# Patient Record
Sex: FEMALE | Race: WHITE | NOT HISPANIC OR LATINO | Employment: UNEMPLOYED | URBAN - METROPOLITAN AREA
[De-identification: names, ages, dates, MRNs, and addresses within clinical notes are randomized per-mention and may not be internally consistent; named-entity substitution may affect disease eponyms.]

---
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
## 2021-05-26 LAB
BACTERIA SPEC AEROBE CULT: NO GROWTH
C TRACH RRNA SPEC DONR QL NAA+PROBE: NORMAL
EXTERNAL ABO GROUPING: NORMAL
EXTERNAL ANTIBODY SCREEN: NORMAL
EXTERNAL HEMATOCRIT: 40 %
EXTERNAL HEMOGLOBIN: 12.9 G/DL
EXTERNAL HEPATITIS B SURFACE ANTIGEN: NEGATIVE
EXTERNAL PLATELET COUNT: 264 K/ΜL
EXTERNAL RH FACTOR: POSITIVE
EXTERNAL SYPHILIS RPR SCREEN: NORMAL
HCV AB S/CO SERPL IA: NORMAL
HEMOGLOBIN FRACTIONATION: NORMAL
HIV 1+2 AB+HIV1 P24 AG SERPL QL IA: NORMAL
N GONORRHOEA DNA SPEC QL NAA+PROBE: NORMAL
RUBV IGG SERPL IA-ACNC: NORMAL

## 2021-08-10 LAB — GLUCOSE 1H P 100 G GLC PO SERPL-MCNC: 83 MG/DL (ref 74–180)

## 2021-09-23 ENCOUNTER — ROUTINE PRENATAL (OUTPATIENT)
Dept: OBSTETRICS AND GYNECOLOGY | Facility: CLINIC | Age: 21
End: 2021-09-23

## 2021-09-23 VITALS
SYSTOLIC BLOOD PRESSURE: 116 MMHG | HEIGHT: 68 IN | DIASTOLIC BLOOD PRESSURE: 74 MMHG | WEIGHT: 162 LBS | BODY MASS INDEX: 24.55 KG/M2

## 2021-09-23 DIAGNOSIS — O30.043 DICHORIONIC DIAMNIOTIC TWIN PREGNANCY IN THIRD TRIMESTER: ICD-10-CM

## 2021-09-23 DIAGNOSIS — O99.019 MATERNAL ANEMIA IN PREGNANCY, ANTEPARTUM: ICD-10-CM

## 2021-09-23 DIAGNOSIS — Z34.80 SUPERVISION OF OTHER NORMAL PREGNANCY, ANTEPARTUM: Primary | ICD-10-CM

## 2021-09-23 LAB
DEPRECATED RDW RBC AUTO: 37.7 FL (ref 37–54)
ERYTHROCYTE [DISTWIDTH] IN BLOOD BY AUTOMATED COUNT: 11.8 % (ref 12.3–15.4)
GLUCOSE 1H P GLC SERPL-MCNC: 83 MG/DL (ref 65–139)
GLUCOSE UR STRIP-MCNC: NEGATIVE MG/DL
HCT VFR BLD AUTO: 28.3 % (ref 34–46.6)
HGB BLD-MCNC: 9.3 G/DL (ref 12–15.9)
MCH RBC QN AUTO: 29.2 PG (ref 26.6–33)
MCHC RBC AUTO-ENTMCNC: 32.9 G/DL (ref 31.5–35.7)
MCV RBC AUTO: 88.7 FL (ref 79–97)
PLATELET # BLD AUTO: 268 10*3/MM3 (ref 140–450)
PMV BLD AUTO: 11.6 FL (ref 6–12)
PROT UR STRIP-MCNC: NEGATIVE MG/DL
RBC # BLD AUTO: 3.19 10*6/MM3 (ref 3.77–5.28)
WBC # BLD AUTO: 12.62 10*3/MM3 (ref 3.4–10.8)

## 2021-09-23 PROCEDURE — 85027 COMPLETE CBC AUTOMATED: CPT | Performed by: OBSTETRICS & GYNECOLOGY

## 2021-09-23 PROCEDURE — 0502F SUBSEQUENT PRENATAL CARE: CPT | Performed by: OBSTETRICS & GYNECOLOGY

## 2021-09-23 PROCEDURE — 82950 GLUCOSE TEST: CPT | Performed by: OBSTETRICS & GYNECOLOGY

## 2021-09-23 RX ORDER — VITAMIN A ACETATE, BETA CAROTENE, ASCORBIC ACID, CHOLECALCIFEROL, .ALPHA.-TOCOPHEROL ACETATE, DL-, THIAMINE MONONITRATE, RIBOFLAVIN, NIACINAMIDE, PYRIDOXINE HYDROCHLORIDE, FOLIC ACID, CYANOCOBALAMIN, CALCIUM CARBONATE, FERROUS FUMARATE, ZINC OXIDE, CUPRIC OXIDE 3080; 12; 120; 400; 1; 1.84; 3; 20; 22; 920; 25; 200; 27; 10; 2 [IU]/1; UG/1; MG/1; [IU]/1; MG/1; MG/1; MG/1; MG/1; MG/1; [IU]/1; MG/1; MG/1; MG/1; MG/1; MG/1
TABLET, FILM COATED ORAL DAILY
COMMUNITY

## 2021-09-23 RX ORDER — DOXYLAMINE SUCCINATE AND PYRIDOXINE HYDROCHLORIDE, DELAYED RELEASE TABLETS 10 MG/10 MG 10; 10 MG/1; MG/1
1 TABLET, DELAYED RELEASE ORAL DAILY
Status: ON HOLD | COMMUNITY
Start: 2021-04-26 | End: 2021-11-26

## 2021-09-23 RX ORDER — PYRIDOXINE HCL (VITAMIN B6) 25 MG
TABLET ORAL
Status: ON HOLD | COMMUNITY
Start: 2021-05-26 | End: 2021-11-26

## 2021-09-23 NOTE — PROGRESS NOTES
Chief Complaint: Scheduled OB visit    HPI: 21 y.o.  at 27w6d   Twin pregnancy at 27.6 weeks   Patient reports baby movement of each baby  No contractions.  No headache no visual changes    Vitals:    21 1316   BP: 116/74   Weight: 73.5 kg (162 lb)       See OB flowsheet also for pregnancy related data.  Fetal heart rate approximately 155  Fundal height appropriate at 34 cm, 6 cm above dates      A/P  1. Intrauterine pregnancy at 27w6d   2. Pregnancy Risk:  HIGH RISK  High risk with twins, sandro-      Diagnoses and all orders for this visit:    1. Supervision of other normal pregnancy, antepartum (Primary)  -     POC Urinalysis Dipstick  -     Gestational Diabetes Screen  -     CBC (No Diff)  -     Tdap (BOOSTRIX) 5-2.5-18.5 LF-MCG/0.5 injection; Inject 0.5 mL into the appropriate muscle as directed by prescriber 1 (One) Time for 1 dose.  Dispense: 0.5 mL; Refill: 0    2. Dichorionic diamniotic twin pregnancy in third trimester    We discussed the need for serial ultrasounds to confirm concordant growth  Next ultrasound is scheduled with maternal-fetal medicine in October  Schedule office visit in 3 5 and 7 weeks  Continue baby aspirin to decrease risk of preeclampsia  Discussed Glucola result at next visit    Encouraged fetal kick counts, 10 movements in 2 hours every day.  To labor and delivery if lack fetal movement  Routine labor warnings were discussed and indications for L & D f/u including bleeding, regular contractions, decreased fetal movement or/and rupture of membranes.   Pre-eclampsia symptoms were discussed and warnings were given.  -----------------------  PLAN:   Return in about 3 weeks (around 10/14/2021).    Kwame Abernathy Sr., MD  2021 13:38 EDT

## 2021-09-24 ENCOUNTER — TELEPHONE (OUTPATIENT)
Dept: OBSTETRICS AND GYNECOLOGY | Facility: CLINIC | Age: 21
End: 2021-09-24

## 2021-09-24 RX ORDER — FERROUS SULFATE 325(65) MG
325 TABLET ORAL DAILY
Qty: 90 TABLET | Refills: 2 | Status: SHIPPED | OUTPATIENT
Start: 2021-09-24

## 2021-09-24 NOTE — TELEPHONE ENCOUNTER
----- Message from Kwame Abernathy Sr., MD sent at 9/24/2021  7:37 AM EDT -----  Please call the patient regarding her abnormal result.  Faiza ordered at pharmacy.

## 2021-10-14 ENCOUNTER — ROUTINE PRENATAL (OUTPATIENT)
Dept: OBSTETRICS AND GYNECOLOGY | Facility: CLINIC | Age: 21
End: 2021-10-14

## 2021-10-14 VITALS — BODY MASS INDEX: 25.61 KG/M2 | SYSTOLIC BLOOD PRESSURE: 121 MMHG | DIASTOLIC BLOOD PRESSURE: 74 MMHG | WEIGHT: 169 LBS

## 2021-10-14 DIAGNOSIS — O30.043 DICHORIONIC DIAMNIOTIC TWIN PREGNANCY IN THIRD TRIMESTER: ICD-10-CM

## 2021-10-14 DIAGNOSIS — Z34.80 SUPERVISION OF OTHER NORMAL PREGNANCY, ANTEPARTUM: Primary | ICD-10-CM

## 2021-10-14 LAB
GLUCOSE UR STRIP-MCNC: NEGATIVE MG/DL
PROT UR STRIP-MCNC: NEGATIVE MG/DL

## 2021-10-14 PROCEDURE — 0502F SUBSEQUENT PRENATAL CARE: CPT | Performed by: OBSTETRICS & GYNECOLOGY

## 2021-10-14 NOTE — PROGRESS NOTES
Chief Complaint:  Scheduled OB visit    HPI: 21 y.o.  at 30w6d     Di Di twins  Positive baby movement  No complaints today    Vitals:    10/14/21 1517   BP: 121/74   Weight: 76.7 kg (169 lb)       See OB flowsheet also for pregnancy related data.  5% discordance on last MFM ultrasound    A/P  1. Intrauterine pregnancy at 30w6d   2. Pregnancy Risk:  HIGH RISK  High risk with twin pregnancy      Diagnoses and all orders for this visit:    1. Supervision of other normal pregnancy, antepartum (Primary)  -     POC Urinalysis Dipstick    2. Dichorionic diamniotic twin pregnancy in third trimester        Encouraged fetal kick counts, 10 movements in 2 hours every day.  To labor and delivery if lack fetal movement  Routine labor warnings were discussed and indications for L & D f/u including bleeding, regular contractions, decreased fetal movement or/and rupture of membranes.      Discussed delivery in the 38th week.  Induction of Vertex vertex   if 1 baby nonvertex  Patient has maternal-fetal medicine growth ultrasound scheduled.  -----------------------  PLAN:   Return in about 2 weeks (around 10/28/2021).    Kwame Abernathy Sr., MD  10/14/2021 15:41 EDT

## 2021-10-28 ENCOUNTER — ROUTINE PRENATAL (OUTPATIENT)
Dept: OBSTETRICS AND GYNECOLOGY | Facility: CLINIC | Age: 21
End: 2021-10-28

## 2021-10-28 VITALS — SYSTOLIC BLOOD PRESSURE: 115 MMHG | WEIGHT: 176 LBS | BODY MASS INDEX: 26.67 KG/M2 | DIASTOLIC BLOOD PRESSURE: 74 MMHG

## 2021-10-28 DIAGNOSIS — O30.043 DICHORIONIC DIAMNIOTIC TWIN PREGNANCY IN THIRD TRIMESTER: ICD-10-CM

## 2021-10-28 DIAGNOSIS — Z34.80 SUPERVISION OF OTHER NORMAL PREGNANCY, ANTEPARTUM: Primary | ICD-10-CM

## 2021-10-28 LAB
GLUCOSE UR STRIP-MCNC: NEGATIVE MG/DL
PROT UR STRIP-MCNC: NEGATIVE MG/DL

## 2021-10-28 PROCEDURE — 0502F SUBSEQUENT PRENATAL CARE: CPT | Performed by: OBSTETRICS & GYNECOLOGY

## 2021-10-28 NOTE — PROGRESS NOTES
Chief Complaint:  Scheduled OB visit with twins    HPI: 21 y.o.  at 32w6d   Good baby movement of each  No bleeding or loss of fluid    Vitals:    10/28/21 1512   BP: 115/74   Weight: 79.8 kg (176 lb)       See OB flowsheet also for pregnancy related data.    A/P  1. Intrauterine pregnancy at 32w6d   2. Pregnancy Risk:  HIGH RISK    With twin pregnancy    Diagnoses and all orders for this visit:    1. Supervision of other normal pregnancy, antepartum (Primary)  -     POC Urinalysis Dipstick    2. Dichorionic diamniotic twin pregnancy in third trimester    MFM visit next week to check concordant growth    Continue prenatal vitamins.  Encouraged fetal kick counts, 10 movements in 2 hours every day.  To labor and delivery if lack fetal movement  Routine labor warnings were discussed and indications for L & D f/u including bleeding, regular contractions, decreased fetal movement or/and rupture of membranes.   -----------------------  PLAN:   Follow-up in 2 weeks, begin NSTs in 2 weeks.    Kwame Abernathy Sr., MD  10/28/2021 15:48 EDT

## 2021-11-11 ENCOUNTER — ROUTINE PRENATAL (OUTPATIENT)
Dept: OBSTETRICS AND GYNECOLOGY | Facility: CLINIC | Age: 21
End: 2021-11-11

## 2021-11-11 ENCOUNTER — HOSPITAL ENCOUNTER (OUTPATIENT)
Facility: HOSPITAL | Age: 21
Discharge: HOME | End: 2021-11-11
Attending: OBSTETRICS & GYNECOLOGY | Admitting: OBSTETRICS & GYNECOLOGY

## 2021-11-11 ENCOUNTER — TELEPHONE (OUTPATIENT)
Dept: OBSTETRICS AND GYNECOLOGY | Facility: CLINIC | Age: 21
End: 2021-11-11

## 2021-11-11 VITALS — DIASTOLIC BLOOD PRESSURE: 82 MMHG | SYSTOLIC BLOOD PRESSURE: 137 MMHG | WEIGHT: 177 LBS | BODY MASS INDEX: 26.83 KG/M2

## 2021-11-11 VITALS — SYSTOLIC BLOOD PRESSURE: 133 MMHG | HEART RATE: 82 BPM | DIASTOLIC BLOOD PRESSURE: 78 MMHG | RESPIRATION RATE: 18 BRPM

## 2021-11-11 DIAGNOSIS — Z34.80 SUPERVISION OF OTHER NORMAL PREGNANCY, ANTEPARTUM: Primary | ICD-10-CM

## 2021-11-11 DIAGNOSIS — O30.043 DICHORIONIC DIAMNIOTIC TWIN PREGNANCY IN THIRD TRIMESTER: ICD-10-CM

## 2021-11-11 LAB
GLUCOSE UR STRIP-MCNC: NEGATIVE MG/DL
PROT UR STRIP-MCNC: NEGATIVE MG/DL

## 2021-11-11 PROCEDURE — 59025 FETAL NON-STRESS TEST: CPT

## 2021-11-11 PROCEDURE — 59025 FETAL NON-STRESS TEST: CPT | Performed by: OBSTETRICS & GYNECOLOGY

## 2021-11-11 PROCEDURE — 59425 ANTEPARTUM CARE ONLY: CPT | Performed by: OBSTETRICS & GYNECOLOGY

## 2021-11-11 PROCEDURE — G0463 HOSPITAL OUTPT CLINIC VISIT: HCPCS

## 2021-11-11 RX ORDER — ASPIRIN 81 MG/1
81 TABLET, CHEWABLE ORAL DAILY
Status: ON HOLD | COMMUNITY
End: 2021-11-26

## 2021-11-11 NOTE — NON STRESS TEST
JOANA Unger   OB NST Note    2021   Name:  Rajani Gomez  MRN: [id number]    Subjective:  21 y.o.  at 34w6d    Indication: Twins    NST:   Baseline: Twin A 135, twin B 130  Variability:   Moderate/Normal (amplitude 6-25 bpm)  Accelerations: Present (32 weeks+) 15 x 15 bpm  Decelerations: Absent   Contractions:  Irritability    NST interpretation: reactive    There were no vitals filed for this visit.      Lab Results (last 24 hours)     Procedure Component Value Units Date/Time    POC Urinalysis Dipstick [[id number]] Collected: 21 1506    Specimen: Urine Updated: 21 1507     Glucose, UA Negative mg/dL      Protein, POC Negative mg/dL            Cervical Exam:    Assessment:  21 y.o.  AT 34w6d  Twins    Plan:   OB Precautions, FKC, Keep scheduled NSTs, Keep office visit      Electronically signed by Js Umana MD, 21, 5:19 PM EST.

## 2021-11-11 NOTE — PROGRESS NOTES
Chief Complaint:  Scheduled OB visit    HPI: 21 y.o.  at 34w6d   Twins  Good movement of both babies  Most recent ultrasound with MFM notes 8% discordance  CHIRAG breech which will result in  delivery if unchanged.    Vitals:    21 1506   BP: 137/82   Weight: 80.3 kg (177 lb)       See OB flowsheet also for pregnancy related data.    A/P  Intrauterine pregnancy at 34w6d   Diagnoses and all orders for this visit:    1. Supervision of other normal pregnancy, antepartum (Primary)  -     POC Urinalysis Dipstick  -     Fetal Nonstress Test; Standing    2. Dichorionic diamniotic twin pregnancy in third trimester    Probable  due to breech twin A  Start NSTs    Blood pressure is out of parameters.  137/82    Continue prenatal vitamins.  Encouraged fetal kick counts, 10 movements in 2 hours every day.  To labor and delivery if lack fetal movement  Routine labor warnings were discussed and indications for L & D f/u including bleeding, regular contractions, decreased fetal movement or/and rupture of membranes.     PLAN:   Return in about 1 week (around 2021).    Kwame Abernathy Sr., MD  2021 15:45 EST

## 2021-11-12 ENCOUNTER — TELEPHONE (OUTPATIENT)
Dept: OBSTETRICS AND GYNECOLOGY | Facility: CLINIC | Age: 21
End: 2021-11-12

## 2021-11-12 ENCOUNTER — PREP FOR SURGERY (OUTPATIENT)
Dept: OTHER | Facility: HOSPITAL | Age: 21
End: 2021-11-12

## 2021-11-12 NOTE — TELEPHONE ENCOUNTER
Left msg 11/11 and 11/12/21 regarding upcoming PCS on 12/6/21.  Patient needs to speak w/Jamila/mailed info out to patient/mjm

## 2021-11-12 NOTE — SIGNIFICANT NOTE
34w6d  /78 (BP Location: Right arm, Patient Position: Sitting)   Pulse 82   Resp 18   LMP 03/20/2021   Reason for test: Other (Comment) (Multiple Gestation)  Date of Test: 11/11/2021  Time frame of test: 5585-7402  RN NST Interpretation: (P) Reactive     11/11/21 1921   Nonstress Test   Reason for NST Other (Comment)  (Multiple Gestation)   Acoustic Stimulator No   Uterine Irritability No   Contractions Not present   Fetal Assessment   Fetal Movement active   Fetal HR Assessment Method external   Fetal HR (beats/min) 140   Fetal Heart Baseline Rate normal range   Fetal HR Variability moderate (amplitude range 6 to 25 bpm)   Fetal HR Accelerations greater than/equal to 15 bpm; lasting at least 15 seconds   Fetal HR Decelerations absent   Fetal HR Tracing Category Category I   Sinusoidal Pattern Present absent   Additional Documentation Fetal Assessment B (Group)   Interpretation A   Nonstress Test Interpretation A Reactive   Fetal Assessment B   Fetal Movement B active   Fetal HR Assessment Method B external   Fetal HR B (beats/min) 130   Fetal Heart Baseline Rate B normal range   Fetal HR Variability B moderate (amplitude range 6 to 25 bpm)   Fetal HR Accelerations B greater than/equal to 15 bpm; lasting at least 15 seconds   Fetal HR Decelerations B absent   Fetal HR Tracing Category B Category I   Sinusoidal Pattern Present B absent   Interpretation B   Nonstress Test Interpretation B Reactive

## 2021-11-18 ENCOUNTER — HOSPITAL ENCOUNTER (OUTPATIENT)
Facility: HOSPITAL | Age: 21
Setting detail: SURGERY ADMIT
Discharge: HOME | End: 2021-11-18
Attending: OBSTETRICS & GYNECOLOGY | Admitting: OBSTETRICS & GYNECOLOGY

## 2021-11-18 ENCOUNTER — HOSPITAL ENCOUNTER (OUTPATIENT)
Dept: LABOR AND DELIVERY | Facility: HOSPITAL | Age: 21
Setting detail: SURGERY ADMIT
Discharge: HOME | End: 2021-11-18

## 2021-11-18 VITALS — HEART RATE: 91 BPM | RESPIRATION RATE: 18 BRPM | SYSTOLIC BLOOD PRESSURE: 132 MMHG | DIASTOLIC BLOOD PRESSURE: 72 MMHG

## 2021-11-18 PROCEDURE — 59025 FETAL NON-STRESS TEST: CPT

## 2021-11-18 PROCEDURE — G0463 HOSPITAL OUTPT CLINIC VISIT: HCPCS

## 2021-11-18 PROCEDURE — 59025 FETAL NON-STRESS TEST: CPT | Performed by: OBSTETRICS & GYNECOLOGY

## 2021-11-18 NOTE — SIGNIFICANT NOTE
11/18/21 1813   Nonstress Test   Reason for NST OB Triage   Acoustic Stimulator No   Uterine Irritability No   Contractions Not present   Fetal Assessment   Fetal Movement active   Fetal HR Assessment Method external   Fetal HR (beats/min) 130   Fetal Heart Baseline Rate normal range   Fetal HR Variability moderate (amplitude range 6 to 25 bpm)   Fetal HR Accelerations lasting at least 15 seconds   Fetal HR Decelerations absent   Interpretation A   Nonstress Test Interpretation A Reactive   Fetal Assessment B   Fetal Movement B active   Fetal HR Assessment Method B external   Fetal HR B (beats/min) 120   Fetal Heart Baseline Rate B normal range   Fetal HR Accelerations B lasting at least 15 seconds   Fetal HR Decelerations B absent   Interpretation B   Nonstress Test Interpretation B Reactive   /72 (BP Location: Left arm, Patient Position: Lying)   Pulse 91   Resp 18   LMP 03/20/2021   35w6d  Reason for test: OB Triage  Date of Test: 11/18/2021  Time frame of test: 9591-9040  RN NST Interpretation: Reactive

## 2021-11-19 ENCOUNTER — PREP FOR SURGERY (OUTPATIENT)
Dept: OTHER | Facility: HOSPITAL | Age: 21
End: 2021-11-19

## 2021-11-19 ENCOUNTER — HOSPITAL ENCOUNTER (OUTPATIENT)
Facility: HOSPITAL | Age: 21
Setting detail: SURGERY ADMIT
Discharge: HOME | End: 2021-11-19
Attending: OBSTETRICS & GYNECOLOGY | Admitting: OBSTETRICS & GYNECOLOGY

## 2021-11-19 ENCOUNTER — HOSPITAL ENCOUNTER (OUTPATIENT)
Facility: HOSPITAL | Age: 21
Discharge: HOME | End: 2021-11-19
Attending: OBSTETRICS & GYNECOLOGY | Admitting: OBSTETRICS & GYNECOLOGY

## 2021-11-19 VITALS
HEART RATE: 100 BPM | TEMPERATURE: 98 F | DIASTOLIC BLOOD PRESSURE: 81 MMHG | BODY MASS INDEX: 26.83 KG/M2 | SYSTOLIC BLOOD PRESSURE: 140 MMHG | RESPIRATION RATE: 18 BRPM | WEIGHT: 177 LBS | OXYGEN SATURATION: 92 %

## 2021-11-19 VITALS
TEMPERATURE: 98.5 F | SYSTOLIC BLOOD PRESSURE: 119 MMHG | HEART RATE: 89 BPM | RESPIRATION RATE: 18 BRPM | DIASTOLIC BLOOD PRESSURE: 86 MMHG

## 2021-11-19 DIAGNOSIS — O13.3 GESTATIONAL HYPERTENSION WITHOUT SIGNIFICANT PROTEINURIA IN THIRD TRIMESTER: Primary | ICD-10-CM

## 2021-11-19 LAB
ALBUMIN SERPL-MCNC: 3.2 G/DL (ref 3.5–5.2)
ALBUMIN/GLOB SERPL: 1.1 G/DL
ALP SERPL-CCNC: 165 U/L (ref 39–117)
ALT SERPL W P-5'-P-CCNC: 13 U/L (ref 1–33)
ANION GAP SERPL CALCULATED.3IONS-SCNC: 10.3 MMOL/L (ref 5–15)
AST SERPL-CCNC: 21 U/L (ref 1–32)
BILIRUB SERPL-MCNC: 0.3 MG/DL (ref 0–1.2)
BUN SERPL-MCNC: 11 MG/DL (ref 6–20)
BUN/CREAT SERPL: 17.7 (ref 7–25)
CALCIUM SPEC-SCNC: 9.4 MG/DL (ref 8.6–10.5)
CHLORIDE SERPL-SCNC: 104 MMOL/L (ref 98–107)
CO2 SERPL-SCNC: 23.7 MMOL/L (ref 22–29)
CREAT SERPL-MCNC: 0.62 MG/DL (ref 0.57–1)
CREAT UR-MCNC: 39.6 MG/DL
DEPRECATED RDW RBC AUTO: 42 FL (ref 37–54)
ERYTHROCYTE [DISTWIDTH] IN BLOOD BY AUTOMATED COUNT: 13.5 % (ref 12.3–15.4)
GFR SERPL CREATININE-BSD FRML MDRD: 123 ML/MIN/1.73
GLOBULIN UR ELPH-MCNC: 2.8 GM/DL
GLUCOSE SERPL-MCNC: 69 MG/DL (ref 65–99)
HCT VFR BLD AUTO: 31.7 % (ref 34–46.6)
HGB BLD-MCNC: 10.1 G/DL (ref 12–15.9)
MCH RBC QN AUTO: 27.7 PG (ref 26.6–33)
MCHC RBC AUTO-ENTMCNC: 31.9 G/DL (ref 31.5–35.7)
MCV RBC AUTO: 87.1 FL (ref 79–97)
PLATELET # BLD AUTO: 193 10*3/MM3 (ref 140–450)
PMV BLD AUTO: 13.2 FL (ref 6–12)
POTASSIUM SERPL-SCNC: 3.8 MMOL/L (ref 3.5–5.2)
PROT ?TM UR-MCNC: 9 MG/DL
PROT SERPL-MCNC: 6 G/DL (ref 6–8.5)
PROT/CREAT UR: 0.2 MG/G{CREAT}
RBC # BLD AUTO: 3.64 10*6/MM3 (ref 3.77–5.28)
SODIUM SERPL-SCNC: 138 MMOL/L (ref 136–145)
WBC NRBC COR # BLD: 11.95 10*3/MM3 (ref 3.4–10.8)

## 2021-11-19 PROCEDURE — 59025 FETAL NON-STRESS TEST: CPT

## 2021-11-19 PROCEDURE — G0463 HOSPITAL OUTPT CLINIC VISIT: HCPCS

## 2021-11-19 PROCEDURE — 84156 ASSAY OF PROTEIN URINE: CPT | Performed by: OBSTETRICS & GYNECOLOGY

## 2021-11-19 PROCEDURE — 80053 COMPREHEN METABOLIC PANEL: CPT | Performed by: OBSTETRICS & GYNECOLOGY

## 2021-11-19 PROCEDURE — 99214 OFFICE O/P EST MOD 30 MIN: CPT | Performed by: OBSTETRICS & GYNECOLOGY

## 2021-11-19 PROCEDURE — 85027 COMPLETE CBC AUTOMATED: CPT | Performed by: OBSTETRICS & GYNECOLOGY

## 2021-11-19 PROCEDURE — 25010000002 BETAMETHASONE ACET & SOD PHOS PER 4 MG: Performed by: OBSTETRICS & GYNECOLOGY

## 2021-11-19 PROCEDURE — 82570 ASSAY OF URINE CREATININE: CPT | Performed by: OBSTETRICS & GYNECOLOGY

## 2021-11-19 PROCEDURE — 59025 FETAL NON-STRESS TEST: CPT | Performed by: OBSTETRICS & GYNECOLOGY

## 2021-11-19 PROCEDURE — 96372 THER/PROPH/DIAG INJ SC/IM: CPT

## 2021-11-19 RX ORDER — BETAMETHASONE SODIUM PHOSPHATE AND BETAMETHASONE ACETATE 3; 3 MG/ML; MG/ML
12 INJECTION, SUSPENSION INTRA-ARTICULAR; INTRALESIONAL; INTRAMUSCULAR; SOFT TISSUE EVERY 24 HOURS
Status: DISCONTINUED | OUTPATIENT
Start: 2021-11-19 | End: 2021-11-19 | Stop reason: HOSPADM

## 2021-11-19 RX ORDER — MISOPROSTOL 200 UG/1
800 TABLET ORAL ONCE AS NEEDED
Status: CANCELLED | OUTPATIENT
Start: 2021-11-19

## 2021-11-19 RX ORDER — SODIUM CHLORIDE 0.9 % (FLUSH) 0.9 %
10 SYRINGE (ML) INJECTION AS NEEDED
Status: CANCELLED | OUTPATIENT
Start: 2021-11-19

## 2021-11-19 RX ORDER — SODIUM CHLORIDE 0.9 % (FLUSH) 0.9 %
10 SYRINGE (ML) INJECTION EVERY 12 HOURS SCHEDULED
Status: CANCELLED | OUTPATIENT
Start: 2021-11-19

## 2021-11-19 RX ORDER — CARBOPROST TROMETHAMINE 250 UG/ML
250 INJECTION, SOLUTION INTRAMUSCULAR
Status: CANCELLED | OUTPATIENT
Start: 2021-11-19

## 2021-11-19 RX ORDER — OXYTOCIN-SODIUM CHLORIDE 0.9% IV SOLN 30 UNIT/500ML 30-0.9/5 UT/ML-%
125 SOLUTION INTRAVENOUS ONCE
Status: CANCELLED | OUTPATIENT
Start: 2021-11-19 | End: 2021-11-19

## 2021-11-19 RX ORDER — LIDOCAINE HYDROCHLORIDE 10 MG/ML
5 INJECTION, SOLUTION EPIDURAL; INFILTRATION; INTRACAUDAL; PERINEURAL AS NEEDED
Status: CANCELLED | OUTPATIENT
Start: 2021-11-19

## 2021-11-19 RX ORDER — ACETAMINOPHEN 500 MG
500 TABLET ORAL EVERY 6 HOURS PRN
COMMUNITY

## 2021-11-19 RX ORDER — TRISODIUM CITRATE DIHYDRATE AND CITRIC ACID MONOHYDRATE 500; 334 MG/5ML; MG/5ML
30 SOLUTION ORAL ONCE
Status: CANCELLED | OUTPATIENT
Start: 2021-11-19 | End: 2021-11-19

## 2021-11-19 RX ORDER — CEFAZOLIN SODIUM 2 G/100ML
2 INJECTION, SOLUTION INTRAVENOUS ONCE
Status: CANCELLED | OUTPATIENT
Start: 2021-11-19 | End: 2021-11-19

## 2021-11-19 RX ORDER — BETAMETHASONE SODIUM PHOSPHATE AND BETAMETHASONE ACETATE 3; 3 MG/ML; MG/ML
12 INJECTION, SUSPENSION INTRA-ARTICULAR; INTRALESIONAL; INTRAMUSCULAR; SOFT TISSUE EVERY 24 HOURS
Status: CANCELLED | OUTPATIENT
Start: 2021-11-20 | End: 2021-11-21

## 2021-11-19 RX ORDER — SODIUM CHLORIDE, SODIUM LACTATE, POTASSIUM CHLORIDE, CALCIUM CHLORIDE 600; 310; 30; 20 MG/100ML; MG/100ML; MG/100ML; MG/100ML
125 INJECTION, SOLUTION INTRAVENOUS CONTINUOUS
Status: CANCELLED | OUTPATIENT
Start: 2021-11-19

## 2021-11-19 RX ORDER — METHYLERGONOVINE MALEATE 0.2 MG/ML
200 INJECTION INTRAVENOUS ONCE AS NEEDED
Status: CANCELLED | OUTPATIENT
Start: 2021-11-19

## 2021-11-19 RX ADMIN — BETAMETHASONE ACETATE AND BETAMETHASONE SODIUM PHOSPHATE 12 MG: 3; 3 INJECTION, SUSPENSION INTRA-ARTICULAR; INTRALESIONAL; INTRAMUSCULAR; SOFT TISSUE at 13:10

## 2021-11-19 NOTE — SIGNIFICANT NOTE
36w0d  Patient Vitals for the past 24 hrs:   BP Temp Temp src Pulse Resp   11/19/21 1250 119/86 -- -- 89 --   11/19/21 1231 124/82 -- -- 84 --   11/19/21 1224 127/92 98.5 °F (36.9 °C) Oral 97 18      Date of Test: 11/19/2021  Time frame of test: 3874-0665  RN NST Interpretation: (P) Reactive       11/19/21 1324   Nonstress Test   Reason for NST   (ELEVATED BLOOD PRESSURE, SENT FROM [facility])   Acoustic Stimulator No   Uterine Irritability No   Contractions Irregular   Fetal Assessment   Fetal Movement active   Fetal HR Assessment Method external   Fetal HR (beats/min) 135   Fetal Heart Baseline Rate normal range   Fetal HR Variability moderate (amplitude range 6 to 25 bpm)   Fetal HR Accelerations lasting at least 15 seconds; greater than/equal to 15 bpm   Fetal HR Decelerations absent   Sinusoidal Pattern Present absent   Interpretation A   Nonstress Test Interpretation A Reactive   Fetal Assessment B   Fetal Movement B active   Fetal HR Assessment Method B external   Fetal HR B (beats/min) 130   Fetal Heart Baseline Rate B normal range   Fetal HR Variability B moderate (amplitude range 6 to 25 bpm)   Fetal HR Accelerations B greater than/equal to 15 bpm; lasting at least 15 seconds   Fetal HR Decelerations B absent   Sinusoidal Pattern Present B absent   Interpretation B   Nonstress Test Interpretation B Reactive

## 2021-11-19 NOTE — H&P
Obstetric History and Physical    Chief complaint: Twins, gestational hypertension    Subjective     Patient is a 21 y.o. female  currently at 36w0d, scheduled for  delivery at 37.0 weeks secondary to gestational hypertension.  The route of delivery is primary  due to nonvertex presentation of twin A.  Patient's pregnancy is complicated by twins and current gestational hypertension.  She has been followed by maternal-fetal medicine for ultrasounds and biophysical profiles.  Most recent ultrasound shows 8% discordance with twin A being breech.  The most recent BPP shows twin A at 8 of 10 twin B 8 of 10.  Patient's been evaluated at [facility] on the same day and has mild gestational hypertension.  Delivery has been moved to 37 weeks.  Preeclampsia precautions given.  Betamethasone administered at 36.0 and 36.1 weeks.    Prenatal Information:  Prenatal Results     POC Urine Glucose/Protein     Test Value Reference Range Date Time    Urine Glucose  Negative mg/dL Negative, 1000 mg/dL (3+) 21 1506    Urine Protein  Negative mg/dL Negative 21 1506          Initial Prenatal Labs     Test Value Reference Range Date Time    Hemoglobin  10.1 g/dL 12.0 - 15.9 21 1202       9.3 g/dL 12.0 - 15.9 21 1437      ^ 12.9 g/dL  21     Hematocrit  31.7 % 34.0 - 46.6 21 1202       28.3 % 34.0 - 46.6 21 1437      ^ 40 %  21     Platelets  193 10*3/mm3 140 - 450 21 1202       268 10*3/mm3 140 - 450 21 1437      ^ 264 K/µL  21     Rubella IgG ^ immune   21     Hepatitis B SAg ^ Negative   21     Hepatitis C Ab ^ neg   21     RPR ^ Non-Reactive   21     ABO ^ O   21     Rh ^ Positive   21     Antibody Screen ^ Normal  Normal 21     HIV ^ neg   21     Urine Culture ^ No growth  No growth at 24 hours, No growth at 2 days, No growth at 3 days, No growth, Growth present, too young to evaluate, Culture in  progress 05/26/21     Gonorrhea ^ neg   05/26/21     Chlamydia ^ neg   05/26/21     TSH        HgB A1c               2nd and 3rd Trimester     Test Value Reference Range Date Time    Hemoglobin (repeated)  10.1 g/dL 12.0 - 15.9 11/19/21 1202       9.3 g/dL 12.0 - 15.9 09/23/21 1437    Hematocrit (repeated)  31.7 % 34.0 - 46.6 11/19/21 1202       28.3 % 34.0 - 46.6 09/23/21 1437    Platelets   193 10*3/mm3 140 - 450 11/19/21 1202       268 10*3/mm3 140 - 450 09/23/21 1437      ^ 264 K/µL  05/26/21     GCT  83 mg/dL 65 - 139 09/23/21 1437      ^ 83 mg/dL 74 - 180 08/10/21     Antibody Screen (repeated)        GTT Fasting        GTT 1 Hr        GTT 2 Hr        GTT 3 Hr        Group B Strep              Drug Screening     Test Value Reference Range Date Time    Amphetamine Screen        Barbiturate Screen        Benzodiazepine Screen        Methadone Screen        Phencyclidine Screen        Opiates Screen        THC Screen        Cocaine Screen        Propoxyphene Screen        Buprenorphine Screen        Methamphetamine Screen        Oxycodone Screen        Tricyclic Antidepressants Screen              Other (Risk screening)     Test Value Reference Range Date Time    Varicella IgG        Parvovirus IgG        CMV IgG        Cystic Fibrosis        Hemoglobin electrophoresis ^ normal   05/26/21     NIPT        MSAFP-4        AFP (for NTD only)              Legend    ^: Historical                      External Prenatal Results     Pregnancy Outside Results - Transcribed From Office Records - See Scanned Records For Details     Test Value Date Time    ABO ^ O  05/26/21     Rh ^ Positive  05/26/21     Antibody Screen ^ Normal  05/26/21     Varicella IgG       Rubella ^ immune  05/26/21     Hgb  10.1 g/dL 11/19/21 1202       9.3 g/dL 09/23/21 1437      ^ 12.9 g/dL 05/26/21     Hct  31.7 % 11/19/21 1202       28.3 % 09/23/21 1437      ^ 40 % 05/26/21     Glucose Fasting GTT       Glucose Tolerance Test 1 hour       Glucose  Tolerance Test 3 hour       Gonorrhea (discrete) ^ neg  21     Chlamydia (discrete) ^ neg  21     RPR ^ Non-Reactive  21     VDRL       Syphilis Antibody       HBsAg ^ Negative  21     Herpes Simplex Virus PCR       Herpes Simplex VIrus Culture       HIV ^ neg  21     Hep C RNA Quant PCR       Hep C Antibody ^ neg  21     AFP       Group B Strep       GBS Susceptibility to Clindamycin       GBS Susceptibility to Erythromycin       Fetal Fibronectin       Genetic Testing, Maternal Blood             Drug Screening     Test Value Date Time    Urine Drug Screen       Amphetamine Screen       Barbiturate Screen       Benzodiazepine Screen       Methadone Screen       Phencyclidine Screen       Opiates Screen       THC Screen       Cocaine Screen       Propoxyphene Screen       Buprenorphine Screen       Methamphetamine Screen       Oxycodone Screen       Tricyclic Antidepressants Screen             Legend    ^: Historical                         Past OB History:     OB History    Para Term  AB Living   1 0 0 0 0 0   SAB IAB Ectopic Molar Multiple Live Births   0 0 0 0 0 0      # Outcome Date GA Lbr Keo/2nd Weight Sex Delivery Anes PTL Lv   1 Current                Past Medical History: No past medical history on file.   Past Surgical History No past surgical history on file.   Family History: Family History   Problem Relation Age of Onset   • Heart disease Other    • Hyperlipidemia Other    • Diabetes Other    • Ovarian cancer Other    • Lung cancer Other       Social History:  reports that she has never smoked. She does not have any smokeless tobacco history on file.  Alcohol use questions deferred to the physician.        General ROS: No headache, no right upper quadrant pain.    Positive movement of twins    Objective       Vital Signs Range for the last 24 hours  Temperature: Temp:  [98.5 °F (36.9 °C)] 98.5 °F (36.9 °C)   Temp Source: Temp src: Oral   BP: BP:  (119-132)/(72-92) 119/86   Pulse: Heart Rate:  [84-97] 89   Respirations: Resp:  [18] 18   SPO2:     O2 Amount (l/min):     O2 Devices     Weight:       Physical Examination: General appearance - alert, well appearing, and in no distress  Mental status - normal mood, behavior, speech, dress, motor activity, and thought processes  Lymphatics - no palpable lymphadenopathy  Chest -unlabored breathing  Heart - S1 and S2 normal  Abdomen -soft, nontender, gravid.  Extremities - no pedal edema noted  Skin - normal coloration and turgor, no rashes, no suspicious skin lesions noted    Presentation:  Baby A breech, baby B vertex       Fetal Heart Rate Assessment       Assessment/Plan     37.0 weeks on day of scheduled delivery.  Twin gestation, nonvertex baby A  Gestational hypertension      Assessment:  Intrauterine pregnancy at 36w0d gestation with reassuring fetal status.    37.0 weeks on day of scheduled delivery.  Twin gestation, nonvertex baby A  Gestational hypertension   GBS status: No results found for: STREPGPB    Plan:  Primary  delivery.  2 g Kefzol ordered.  Plan of care has been reviewed with patient.  Risks, benefits of treatment plan have been discussed.  All questions have been answered.         Kwame Abernathy Sr., MD  2021  13:31 EST

## 2021-11-19 NOTE — PROGRESS NOTES
Obstetrical Non-stress Test Interpretation     Name:  Rajani Gomez  MRN: [id number]    21 y.o. female  at 36w0d    Indication: Twins, hypertension    Weeks Gestation: 36w0d   Patient was at maternal-fetal medicine ultrasound visit this morning. She was found to have systolic blood pressure of 148. Dr. Frank Viera called me and asked for patient to be evaluated at [facility].  Patient has mild hypertension during her visit now. She has now diagnosed with mild gestational hypertension, twin pregnancy, nonvertex presentation.  I discussed the risk of preeclampsia and worsening hypertension. We have discussed the risk of stillbirth, vaginal bleeding, abruption, preeclampsia, etc. Patient agrees to delivery at 37.0 weeks next Friday to arrive at 1 PM.  Delivery will be by primary  due to nonvertex presentation of a twin.      Baseline: 130 BPM  Variability:   Moderate/Normal (amplitude 6-25 bpm)  Accelerations: Present (32 weeks+) 15 x 15 bpm  Decelerations: Absent   Contractions:  Absent    Impression:  Reactive NST, 36.0 weeks., Twins, nonvertex  Gestational hypertension  The need for delivery was discussed in depth with the patient. The patient agrees to the plan. Route of delivery will be . Risk and benefits of surgery have been discussed.    Plan: Discharge to home. Follow-up in 7 days at the hospital for delivery.  Preeclampsia prevention instructions given.      Kwame Abernathy Sr., MD  2021  13:10 EST

## 2021-11-19 NOTE — NON STRESS TEST
JOANA Unger   OB NST Note    2021   Name:  Rajani Gomez  MRN: [id number]    Subjective:  21 y.o.  at 35w6d    Indication: Twins    NST:   Baseline: A 140 B 135  Variability:   Moderate/Normal (amplitude 6-25 bpm)  Accelerations: Present (32 weeks+) 15 x 15 bpm  Decelerations: Absent   Contractions:  Not regular    NST interpretation: reactive x2    Documented Vitals    21 1800   BP: 132/72   Pulse: 91   Resp: 18         Lab Results (last 24 hours)     ** No results found for the last 24 hours. **           Cervical Exam:    Assessment:  21 y.o.  AT 35w6d  Twins    Plan:   OB Precautions, FKC, Keep scheduled NSTs, Keep office visit      Electronically signed by Js Umana MD, 21, 8:10 PM EST.

## 2021-11-20 ENCOUNTER — HOSPITAL ENCOUNTER (OUTPATIENT)
Facility: HOSPITAL | Age: 21
Setting detail: SURGERY ADMIT
Discharge: HOME | End: 2021-11-20
Attending: OBSTETRICS & GYNECOLOGY | Admitting: OBSTETRICS & GYNECOLOGY

## 2021-11-20 DIAGNOSIS — O13.3 GESTATIONAL HYPERTENSION WITHOUT SIGNIFICANT PROTEINURIA IN THIRD TRIMESTER: ICD-10-CM

## 2021-11-20 PROCEDURE — 25010000002 BETAMETHASONE ACET & SOD PHOS PER 4 MG: Performed by: OBSTETRICS & GYNECOLOGY

## 2021-11-20 PROCEDURE — 96372 THER/PROPH/DIAG INJ SC/IM: CPT

## 2021-11-20 RX ORDER — BETAMETHASONE SODIUM PHOSPHATE AND BETAMETHASONE ACETATE 3; 3 MG/ML; MG/ML
12 INJECTION, SUSPENSION INTRA-ARTICULAR; INTRALESIONAL; INTRAMUSCULAR; SOFT TISSUE EVERY 24 HOURS
Status: COMPLETED | OUTPATIENT
Start: 2021-11-20 | End: 2021-11-20

## 2021-11-20 RX ADMIN — BETAMETHASONE ACETATE AND BETAMETHASONE SODIUM PHOSPHATE 12 MG: 3; 3 INJECTION, SUSPENSION INTRA-ARTICULAR; INTRALESIONAL; INTRAMUSCULAR; SOFT TISSUE at 13:22

## 2021-11-20 NOTE — NURSING NOTE
Dr Abernathy notified of   36w0d c/o intermittent abdominal pain, dizziness and seeing spots. Pt is carrying twin and scheduled for  at 37 wks due to nonvertex presentation of baby A. She received a betamethasone shot earlier in the day.   EFM shows contractions 5-6 min apart , both FHR reactive. BP's elevated but not in critical range.Orders received to monitor, provide po hydration and discharge if not in active labor and if B/P remains below critical range.

## 2021-11-20 NOTE — NURSING NOTE
Patient here for second betamethasone dose.  Patient denies any bleeding, leaking, headaches, blurry vision, right epigastric pain. Patient is reporting good fetal movements and is denying any new or worsening contractions at this.     Patient may be discharged to home. Discharge instructions given and reviewed with patient, with emphasis on monitoring for signs of increasing BP, signs of labor, and doing fetal kick counts. Patient agrees with plan of care and verbalizes understanding of discharge instructions. No further questions at this time and patient agrees to keep scheduled follow up appointment.

## 2021-11-20 NOTE — SIGNIFICANT NOTE
36w0d   /81   Pulse 100   Temp 98 °F (36.7 °C) (Oral)   Resp 18   Wt 80.3 kg (177 lb)   LMP 2021   SpO2 92%   BMI 26.83 kg/m²      Reason for test: (P) OB Triage, Hypertension  Date of Test: 2021  Time frame of test: 8311-2485  RN NST Interpretation:   Reactive     21   Nonstress Test   Reason for NST OB Triage; Hypertension   Acoustic Stimulator No   Uterine Irritability No   Contractions Irregular   Contraction Frequency (Minutes) 5-6   Fetal Assessment   Fetal Movement active   Fetal HR Assessment Method external   Fetal HR (beats/min) 130   Fetal Heart Baseline Rate normal range   Fetal HR Variability moderate (amplitude range 6 to 25 bpm)   Fetal HR Accelerations lasting at least 15 seconds; greater than/equal to 15 bpm   Fetal HR Decelerations absent   Sinusoidal Pattern Present absent   Fetal Assessment B   Fetal Movement B active   Fetal HR Assessment Method B external   Fetal HR B (beats/min) 130   Fetal Heart Baseline Rate B normal range   Fetal HR Variability B moderate (amplitude range 6 to 25 bpm)   Fetal HR Accelerations B greater than/equal to 15 bpm; lasting at least 15 seconds   Fetal HR Decelerations B absent   Sinusoidal Pattern Present B absent

## 2021-11-20 NOTE — NURSING NOTE
Bedside instructions give for labor precautions, and hypertension precautions. Pt verbalized understanding left unit ambulatory no visible signs of distress.

## 2021-11-26 ENCOUNTER — ANESTHESIA (OUTPATIENT)
Dept: LABOR AND DELIVERY | Facility: HOSPITAL | Age: 21
End: 2021-11-26

## 2021-11-26 ENCOUNTER — HOSPITAL ENCOUNTER (INPATIENT)
Facility: HOSPITAL | Age: 21
LOS: 2 days | Discharge: HOME | End: 2021-11-28
Attending: OBSTETRICS & GYNECOLOGY | Admitting: OBSTETRICS & GYNECOLOGY

## 2021-11-26 ENCOUNTER — ANESTHESIA EVENT (OUTPATIENT)
Dept: LABOR AND DELIVERY | Facility: HOSPITAL | Age: 21
End: 2021-11-26

## 2021-11-26 PROBLEM — O30.043 DICHORIONIC DIAMNIOTIC TWIN PREGNANCY IN THIRD TRIMESTER: Status: RESOLVED | Noted: 2021-09-23 | Resolved: 2021-11-26

## 2021-11-26 PROBLEM — Z34.80 SUPERVISION OF OTHER NORMAL PREGNANCY, ANTEPARTUM: Status: RESOLVED | Noted: 2021-09-23 | Resolved: 2021-11-26

## 2021-11-26 LAB
ABO GROUP BLD: NORMAL
ABO GROUP BLD: NORMAL
ALBUMIN SERPL-MCNC: 3.5 G/DL (ref 3.5–5.2)
ALBUMIN/GLOB SERPL: 1.1 G/DL
ALP SERPL-CCNC: 205 U/L (ref 39–117)
ALT SERPL W P-5'-P-CCNC: 21 U/L (ref 1–33)
ANION GAP SERPL CALCULATED.3IONS-SCNC: 13.4 MMOL/L (ref 5–15)
AST SERPL-CCNC: 30 U/L (ref 1–32)
BASE EXCESS BLDCOA CALC-SCNC: -3.9 MMOL/L
BASE EXCESS BLDCOV CALC-SCNC: -3.6 MMOL/L
BILIRUB SERPL-MCNC: 0.4 MG/DL (ref 0–1.2)
BLD GP AB SCN SERPL QL: NEGATIVE
BUN SERPL-MCNC: 12 MG/DL (ref 6–20)
BUN/CREAT SERPL: 21.1 (ref 7–25)
CALCIUM SPEC-SCNC: 8.9 MG/DL (ref 8.6–10.5)
CHLORIDE SERPL-SCNC: 104 MMOL/L (ref 98–107)
CO2 SERPL-SCNC: 20.6 MMOL/L (ref 22–29)
CREAT SERPL-MCNC: 0.57 MG/DL (ref 0.57–1)
DEPRECATED RDW RBC AUTO: 43.9 FL (ref 37–54)
DEPRECATED RDW RBC AUTO: 43.9 FL (ref 37–54)
ERYTHROCYTE [DISTWIDTH] IN BLOOD BY AUTOMATED COUNT: 14.6 % (ref 12.3–15.4)
ERYTHROCYTE [DISTWIDTH] IN BLOOD BY AUTOMATED COUNT: 14.6 % (ref 12.3–15.4)
FIBRINOGEN PPP-MCNC: 435 MG/DL (ref 200–450)
GFR SERPL CREATININE-BSD FRML MDRD: 135 ML/MIN/1.73
GLOBULIN UR ELPH-MCNC: 3.1 GM/DL
GLUCOSE SERPL-MCNC: 71 MG/DL (ref 65–99)
HCO3 BLDCOA-SCNC: 23.7 MMOL/L
HCO3 BLDCOV-SCNC: 22.3 MMOL/L
HCT VFR BLD AUTO: 30.4 % (ref 34–46.6)
HCT VFR BLD AUTO: 36.1 % (ref 34–46.6)
HGB BLD-MCNC: 11.6 G/DL (ref 12–15.9)
HGB BLD-MCNC: 9.7 G/DL (ref 12–15.9)
MCH RBC QN AUTO: 27.7 PG (ref 26.6–33)
MCH RBC QN AUTO: 27.8 PG (ref 26.6–33)
MCHC RBC AUTO-ENTMCNC: 31.9 G/DL (ref 31.5–35.7)
MCHC RBC AUTO-ENTMCNC: 32.1 G/DL (ref 31.5–35.7)
MCV RBC AUTO: 86.4 FL (ref 79–97)
MCV RBC AUTO: 86.9 FL (ref 79–97)
PCO2 BLDCOA: 53.1 MMHG (ref 33–49)
PCO2 BLDCOV: 43.4 MM HG (ref 28–40)
PH BLDCOA: 7.27 PH UNITS (ref 7.21–7.31)
PH BLDCOV: 7.33 PH UNITS (ref 7.31–7.37)
PLATELET # BLD AUTO: 179 10*3/MM3 (ref 140–450)
PLATELET # BLD AUTO: 236 10*3/MM3 (ref 140–450)
PMV BLD AUTO: 13 FL (ref 6–12)
PMV BLD AUTO: 13.2 FL (ref 6–12)
PO2 BLDCOA: <40.5 MMHG
PO2 BLDCOV: <40.5 MM HG (ref 21–31)
POTASSIUM SERPL-SCNC: 4.1 MMOL/L (ref 3.5–5.2)
PROT SERPL-MCNC: 6.6 G/DL (ref 6–8.5)
RBC # BLD AUTO: 3.5 10*6/MM3 (ref 3.77–5.28)
RBC # BLD AUTO: 4.18 10*6/MM3 (ref 3.77–5.28)
RH BLD: POSITIVE
RH BLD: POSITIVE
SODIUM SERPL-SCNC: 138 MMOL/L (ref 136–145)
T&S EXPIRATION DATE: NORMAL
WBC NRBC COR # BLD: 14.85 10*3/MM3 (ref 3.4–10.8)
WBC NRBC COR # BLD: 16.75 10*3/MM3 (ref 3.4–10.8)

## 2021-11-26 PROCEDURE — 86900 BLOOD TYPING SEROLOGIC ABO: CPT

## 2021-11-26 PROCEDURE — 25010000002 METHYLERGONOVINE MALEATE PER 0.2 MG: Performed by: OBSTETRICS & GYNECOLOGY

## 2021-11-26 PROCEDURE — 59515 CESAREAN DELIVERY: CPT | Performed by: OBSTETRICS & GYNECOLOGY

## 2021-11-26 PROCEDURE — 82803 BLOOD GASES ANY COMBINATION: CPT | Performed by: OBSTETRICS & GYNECOLOGY

## 2021-11-26 PROCEDURE — 25010000002 FENTANYL CITRATE (PF) 50 MCG/ML SOLUTION: Performed by: ANESTHESIOLOGY

## 2021-11-26 PROCEDURE — 25010000002 METOCLOPRAMIDE PER 10 MG: Performed by: OBSTETRICS & GYNECOLOGY

## 2021-11-26 PROCEDURE — 25010000002 ONDANSETRON PER 1 MG: Performed by: NURSE ANESTHETIST, CERTIFIED REGISTERED

## 2021-11-26 PROCEDURE — 86850 RBC ANTIBODY SCREEN: CPT | Performed by: OBSTETRICS & GYNECOLOGY

## 2021-11-26 PROCEDURE — 80053 COMPREHEN METABOLIC PANEL: CPT | Performed by: OBSTETRICS & GYNECOLOGY

## 2021-11-26 PROCEDURE — 25010000002 KETOROLAC TROMETHAMINE PER 15 MG: Performed by: NURSE ANESTHETIST, CERTIFIED REGISTERED

## 2021-11-26 PROCEDURE — 85027 COMPLETE CBC AUTOMATED: CPT | Performed by: OBSTETRICS & GYNECOLOGY

## 2021-11-26 PROCEDURE — 86900 BLOOD TYPING SEROLOGIC ABO: CPT | Performed by: OBSTETRICS & GYNECOLOGY

## 2021-11-26 PROCEDURE — 86901 BLOOD TYPING SEROLOGIC RH(D): CPT

## 2021-11-26 PROCEDURE — 0 CEFAZOLIN IN DEXTROSE 2-4 GM/100ML-% SOLUTION: Performed by: OBSTETRICS & GYNECOLOGY

## 2021-11-26 PROCEDURE — 0 MORPHINE PER 10 MG: Performed by: ANESTHESIOLOGY

## 2021-11-26 PROCEDURE — 85384 FIBRINOGEN ACTIVITY: CPT | Performed by: OBSTETRICS & GYNECOLOGY

## 2021-11-26 PROCEDURE — 25010000002 HYDROMORPHONE 1 MG/ML SOLUTION: Performed by: NURSE ANESTHETIST, CERTIFIED REGISTERED

## 2021-11-26 PROCEDURE — 86901 BLOOD TYPING SEROLOGIC RH(D): CPT | Performed by: OBSTETRICS & GYNECOLOGY

## 2021-11-26 RX ORDER — MORPHINE SULFATE 0.5 MG/ML
INJECTION, SOLUTION EPIDURAL; INTRATHECAL; INTRAVENOUS
Status: COMPLETED | OUTPATIENT
Start: 2021-11-26 | End: 2021-11-26

## 2021-11-26 RX ORDER — EPHEDRINE SULFATE 50 MG/ML
INJECTION, SOLUTION INTRAVENOUS AS NEEDED
Status: DISCONTINUED | OUTPATIENT
Start: 2021-11-26 | End: 2021-11-26 | Stop reason: SURG

## 2021-11-26 RX ORDER — IBUPROFEN 600 MG/1
600 TABLET ORAL EVERY 6 HOURS PRN
Qty: 60 TABLET | Refills: 0 | Status: SHIPPED | OUTPATIENT
Start: 2021-11-26

## 2021-11-26 RX ORDER — CARBOPROST TROMETHAMINE 250 UG/ML
250 INJECTION, SOLUTION INTRAMUSCULAR AS NEEDED
Status: DISCONTINUED | OUTPATIENT
Start: 2021-11-26 | End: 2021-11-26 | Stop reason: HOSPADM

## 2021-11-26 RX ORDER — ACETAMINOPHEN 500 MG
1000 TABLET ORAL ONCE
Status: DISCONTINUED | OUTPATIENT
Start: 2021-11-26 | End: 2021-11-26 | Stop reason: SDUPTHER

## 2021-11-26 RX ORDER — TRISODIUM CITRATE DIHYDRATE AND CITRIC ACID MONOHYDRATE 500; 334 MG/5ML; MG/5ML
30 SOLUTION ORAL ONCE
Status: COMPLETED | OUTPATIENT
Start: 2021-11-26 | End: 2021-11-26

## 2021-11-26 RX ORDER — ONDANSETRON 4 MG/1
4 TABLET, FILM COATED ORAL EVERY 6 HOURS PRN
Status: DISCONTINUED | OUTPATIENT
Start: 2021-11-26 | End: 2021-11-28 | Stop reason: HOSPADM

## 2021-11-26 RX ORDER — DOCUSATE SODIUM 100 MG/1
100 CAPSULE, LIQUID FILLED ORAL 2 TIMES DAILY PRN
Status: DISCONTINUED | OUTPATIENT
Start: 2021-11-26 | End: 2021-11-28 | Stop reason: HOSPADM

## 2021-11-26 RX ORDER — MISOPROSTOL 100 UG/1
200 TABLET ORAL
Status: DISCONTINUED | OUTPATIENT
Start: 2021-11-26 | End: 2021-11-26 | Stop reason: HOSPADM

## 2021-11-26 RX ORDER — FENTANYL CITRATE 50 UG/ML
INJECTION, SOLUTION INTRAMUSCULAR; INTRAVENOUS
Status: COMPLETED | OUTPATIENT
Start: 2021-11-26 | End: 2021-11-26

## 2021-11-26 RX ORDER — HYDROCODONE BITARTRATE AND ACETAMINOPHEN 5; 325 MG/1; MG/1
1 TABLET ORAL EVERY 6 HOURS PRN
Status: DISPENSED | OUTPATIENT
Start: 2021-11-26 | End: 2021-11-27

## 2021-11-26 RX ORDER — IBUPROFEN 600 MG/1
600 TABLET ORAL EVERY 6 HOURS SCHEDULED
Status: DISCONTINUED | OUTPATIENT
Start: 2021-11-27 | End: 2021-11-27

## 2021-11-26 RX ORDER — MISOPROSTOL 200 UG/1
TABLET ORAL
Status: DISPENSED
Start: 2021-11-26 | End: 2021-11-27

## 2021-11-26 RX ORDER — ONDANSETRON 2 MG/ML
4 INJECTION INTRAMUSCULAR; INTRAVENOUS EVERY 6 HOURS PRN
Status: DISPENSED | OUTPATIENT
Start: 2021-11-26 | End: 2021-11-27

## 2021-11-26 RX ORDER — OXYCODONE HYDROCHLORIDE 5 MG/1
5 TABLET ORAL
Status: DISCONTINUED | OUTPATIENT
Start: 2021-11-26 | End: 2021-11-26

## 2021-11-26 RX ORDER — FAMOTIDINE 10 MG/ML
20 INJECTION, SOLUTION INTRAVENOUS ONCE AS NEEDED
Status: COMPLETED | OUTPATIENT
Start: 2021-11-26 | End: 2021-11-26

## 2021-11-26 RX ORDER — ALUMINA, MAGNESIA, AND SIMETHICONE 2400; 2400; 240 MG/30ML; MG/30ML; MG/30ML
15 SUSPENSION ORAL EVERY 4 HOURS PRN
Status: DISCONTINUED | OUTPATIENT
Start: 2021-11-26 | End: 2021-11-28 | Stop reason: HOSPADM

## 2021-11-26 RX ORDER — PHENYLEPHRINE HCL IN 0.9% NACL 1 MG/10 ML
SYRINGE (ML) INTRAVENOUS AS NEEDED
Status: DISCONTINUED | OUTPATIENT
Start: 2021-11-26 | End: 2021-11-26 | Stop reason: SURG

## 2021-11-26 RX ORDER — METOCLOPRAMIDE HYDROCHLORIDE 5 MG/ML
10 INJECTION INTRAMUSCULAR; INTRAVENOUS ONCE
Status: DISCONTINUED | OUTPATIENT
Start: 2021-11-26 | End: 2021-11-26 | Stop reason: SDUPTHER

## 2021-11-26 RX ORDER — CALCIUM CARBONATE 200(500)MG
1 TABLET,CHEWABLE ORAL EVERY 4 HOURS PRN
Status: DISCONTINUED | OUTPATIENT
Start: 2021-11-26 | End: 2021-11-28 | Stop reason: HOSPADM

## 2021-11-26 RX ORDER — ONDANSETRON 2 MG/ML
4 INJECTION INTRAMUSCULAR; INTRAVENOUS EVERY 6 HOURS PRN
Status: DISCONTINUED | OUTPATIENT
Start: 2021-11-26 | End: 2021-11-26 | Stop reason: SDUPTHER

## 2021-11-26 RX ORDER — KETOROLAC TROMETHAMINE 30 MG/ML
INJECTION, SOLUTION INTRAMUSCULAR; INTRAVENOUS AS NEEDED
Status: DISCONTINUED | OUTPATIENT
Start: 2021-11-26 | End: 2021-11-26 | Stop reason: SURG

## 2021-11-26 RX ORDER — OXYTOCIN 10 [USP'U]/ML
INJECTION, SOLUTION INTRAMUSCULAR; INTRAVENOUS AS NEEDED
Status: DISCONTINUED | OUTPATIENT
Start: 2021-11-26 | End: 2021-11-26 | Stop reason: SURG

## 2021-11-26 RX ORDER — NALOXONE HCL 0.4 MG/ML
0.4 VIAL (ML) INJECTION ONCE AS NEEDED
Status: ACTIVE | OUTPATIENT
Start: 2021-11-26 | End: 2021-11-27

## 2021-11-26 RX ORDER — SODIUM CHLORIDE 0.9 % (FLUSH) 0.9 %
10 SYRINGE (ML) INJECTION AS NEEDED
Status: DISCONTINUED | OUTPATIENT
Start: 2021-11-26 | End: 2021-11-26 | Stop reason: HOSPADM

## 2021-11-26 RX ORDER — METOCLOPRAMIDE HYDROCHLORIDE 5 MG/ML
10 INJECTION INTRAMUSCULAR; INTRAVENOUS ONCE AS NEEDED
Status: COMPLETED | OUTPATIENT
Start: 2021-11-26 | End: 2021-11-26

## 2021-11-26 RX ORDER — HYDROCODONE BITARTRATE AND ACETAMINOPHEN 10; 325 MG/1; MG/1
1 TABLET ORAL EVERY 6 HOURS PRN
Status: DISCONTINUED | OUTPATIENT
Start: 2021-11-27 | End: 2021-11-28 | Stop reason: HOSPADM

## 2021-11-26 RX ORDER — PROMETHAZINE HYDROCHLORIDE 25 MG/1
25 TABLET ORAL ONCE AS NEEDED
Status: DISCONTINUED | OUTPATIENT
Start: 2021-11-26 | End: 2021-11-26

## 2021-11-26 RX ORDER — OXYTOCIN-SODIUM CHLORIDE 0.9% IV SOLN 30 UNIT/500ML 30-0.9/5 UT/ML-%
SOLUTION INTRAVENOUS
Status: COMPLETED
Start: 2021-11-26 | End: 2021-11-26

## 2021-11-26 RX ORDER — MISOPROSTOL 200 UG/1
200 TABLET ORAL ONCE
Status: COMPLETED | OUTPATIENT
Start: 2021-11-26 | End: 2021-11-26

## 2021-11-26 RX ORDER — ONDANSETRON 2 MG/ML
4 INJECTION INTRAMUSCULAR; INTRAVENOUS ONCE AS NEEDED
Status: DISCONTINUED | OUTPATIENT
Start: 2021-11-26 | End: 2021-11-26

## 2021-11-26 RX ORDER — DIPHENOXYLATE HYDROCHLORIDE AND ATROPINE SULFATE 2.5; .025 MG/1; MG/1
1 TABLET ORAL
Status: DISCONTINUED | OUTPATIENT
Start: 2021-11-26 | End: 2021-11-28 | Stop reason: HOSPADM

## 2021-11-26 RX ORDER — CARBOPROST TROMETHAMINE 250 UG/ML
250 INJECTION, SOLUTION INTRAMUSCULAR
Status: DISCONTINUED | OUTPATIENT
Start: 2021-11-26 | End: 2021-11-28 | Stop reason: HOSPADM

## 2021-11-26 RX ORDER — KETOROLAC TROMETHAMINE 30 MG/ML
30 INJECTION, SOLUTION INTRAMUSCULAR; INTRAVENOUS EVERY 6 HOURS
Status: DISCONTINUED | OUTPATIENT
Start: 2021-11-26 | End: 2021-11-27

## 2021-11-26 RX ORDER — HYDROCORTISONE 25 MG/G
CREAM TOPICAL 3 TIMES DAILY PRN
Status: DISCONTINUED | OUTPATIENT
Start: 2021-11-26 | End: 2021-11-28 | Stop reason: HOSPADM

## 2021-11-26 RX ORDER — METHYLERGONOVINE MALEATE 0.2 MG/ML
200 INJECTION INTRAVENOUS ONCE AS NEEDED
Status: DISCONTINUED | OUTPATIENT
Start: 2021-11-26 | End: 2021-11-26 | Stop reason: HOSPADM

## 2021-11-26 RX ORDER — BUPIVACAINE HYDROCHLORIDE 7.5 MG/ML
INJECTION, SOLUTION EPIDURAL; RETROBULBAR
Status: COMPLETED | OUTPATIENT
Start: 2021-11-26 | End: 2021-11-26

## 2021-11-26 RX ORDER — BUTORPHANOL TARTRATE 1 MG/ML
2 INJECTION, SOLUTION INTRAMUSCULAR; INTRAVENOUS EVERY 6 HOURS PRN
Status: ACTIVE | OUTPATIENT
Start: 2021-11-26 | End: 2021-11-27

## 2021-11-26 RX ORDER — MEPERIDINE HYDROCHLORIDE 25 MG/ML
12.5 INJECTION INTRAMUSCULAR; INTRAVENOUS; SUBCUTANEOUS
Status: DISCONTINUED | OUTPATIENT
Start: 2021-11-26 | End: 2021-11-26

## 2021-11-26 RX ORDER — HYDROXYZINE HYDROCHLORIDE 25 MG/1
50 TABLET, FILM COATED ORAL EVERY 6 HOURS PRN
Status: DISCONTINUED | OUTPATIENT
Start: 2021-11-26 | End: 2021-11-28 | Stop reason: HOSPADM

## 2021-11-26 RX ORDER — PROMETHAZINE HYDROCHLORIDE 25 MG/1
25 SUPPOSITORY RECTAL ONCE AS NEEDED
Status: DISCONTINUED | OUTPATIENT
Start: 2021-11-26 | End: 2021-11-26

## 2021-11-26 RX ORDER — FAMOTIDINE 10 MG/ML
20 INJECTION, SOLUTION INTRAVENOUS ONCE
Status: DISCONTINUED | OUTPATIENT
Start: 2021-11-26 | End: 2021-11-26 | Stop reason: SDUPTHER

## 2021-11-26 RX ORDER — DIPHENHYDRAMINE HYDROCHLORIDE 50 MG/ML
12.5 INJECTION INTRAMUSCULAR; INTRAVENOUS EVERY 6 HOURS PRN
Status: ACTIVE | OUTPATIENT
Start: 2021-11-26 | End: 2021-11-27

## 2021-11-26 RX ORDER — ACETAMINOPHEN 325 MG/1
650 TABLET ORAL ONCE
Status: COMPLETED | OUTPATIENT
Start: 2021-11-26 | End: 2021-11-26

## 2021-11-26 RX ORDER — SODIUM CHLORIDE, SODIUM LACTATE, POTASSIUM CHLORIDE, CALCIUM CHLORIDE 600; 310; 30; 20 MG/100ML; MG/100ML; MG/100ML; MG/100ML
9 INJECTION, SOLUTION INTRAVENOUS CONTINUOUS PRN
Status: DISCONTINUED | OUTPATIENT
Start: 2021-11-26 | End: 2021-11-28 | Stop reason: HOSPADM

## 2021-11-26 RX ORDER — HYDROCODONE BITARTRATE AND ACETAMINOPHEN 5; 325 MG/1; MG/1
1 TABLET ORAL EVERY 6 HOURS PRN
Qty: 24 TABLET | Refills: 0 | Status: SHIPPED | OUTPATIENT
Start: 2021-11-26

## 2021-11-26 RX ORDER — CEFAZOLIN SODIUM 2 G/100ML
2 INJECTION, SOLUTION INTRAVENOUS ONCE
Status: COMPLETED | OUTPATIENT
Start: 2021-11-26 | End: 2021-11-26

## 2021-11-26 RX ORDER — OXYTOCIN-SODIUM CHLORIDE 0.9% IV SOLN 30 UNIT/500ML 30-0.9/5 UT/ML-%
125 SOLUTION INTRAVENOUS ONCE
Status: COMPLETED | OUTPATIENT
Start: 2021-11-26 | End: 2021-11-26

## 2021-11-26 RX ORDER — SODIUM CHLORIDE, SODIUM LACTATE, POTASSIUM CHLORIDE, CALCIUM CHLORIDE 600; 310; 30; 20 MG/100ML; MG/100ML; MG/100ML; MG/100ML
125 INJECTION, SOLUTION INTRAVENOUS CONTINUOUS
Status: DISCONTINUED | OUTPATIENT
Start: 2021-11-26 | End: 2021-11-26

## 2021-11-26 RX ORDER — HYDROCODONE BITARTRATE AND ACETAMINOPHEN 5; 325 MG/1; MG/1
1 TABLET ORAL EVERY 6 HOURS PRN
Status: DISCONTINUED | OUTPATIENT
Start: 2021-11-27 | End: 2021-11-28 | Stop reason: HOSPADM

## 2021-11-26 RX ORDER — DIPHENHYDRAMINE HCL 25 MG
25 CAPSULE ORAL EVERY 6 HOURS PRN
Status: ACTIVE | OUTPATIENT
Start: 2021-11-26 | End: 2021-11-27

## 2021-11-26 RX ORDER — SODIUM CHLORIDE 0.9 % (FLUSH) 0.9 %
3 SYRINGE (ML) INJECTION EVERY 12 HOURS SCHEDULED
Status: DISCONTINUED | OUTPATIENT
Start: 2021-11-26 | End: 2021-11-26 | Stop reason: HOSPADM

## 2021-11-26 RX ORDER — METHYLERGONOVINE MALEATE 0.2 MG/ML
200 INJECTION INTRAVENOUS
Status: COMPLETED | OUTPATIENT
Start: 2021-11-26 | End: 2021-11-26

## 2021-11-26 RX ORDER — LIDOCAINE HYDROCHLORIDE 10 MG/ML
5 INJECTION, SOLUTION EPIDURAL; INFILTRATION; INTRACAUDAL; PERINEURAL AS NEEDED
Status: DISCONTINUED | OUTPATIENT
Start: 2021-11-26 | End: 2021-11-26 | Stop reason: HOSPADM

## 2021-11-26 RX ORDER — SODIUM CHLORIDE, SODIUM LACTATE, POTASSIUM CHLORIDE, CALCIUM CHLORIDE 600; 310; 30; 20 MG/100ML; MG/100ML; MG/100ML; MG/100ML
125 INJECTION, SOLUTION INTRAVENOUS CONTINUOUS
Status: DISCONTINUED | OUTPATIENT
Start: 2021-11-26 | End: 2021-11-28 | Stop reason: HOSPADM

## 2021-11-26 RX ADMIN — HYDROMORPHONE HYDROCHLORIDE 0.25 MG: 1 INJECTION, SOLUTION INTRAMUSCULAR; INTRAVENOUS; SUBCUTANEOUS at 16:16

## 2021-11-26 RX ADMIN — MISOPROSTOL 200 MCG: 100 TABLET ORAL at 16:00

## 2021-11-26 RX ADMIN — ACETAMINOPHEN 650 MG: 325 TABLET ORAL at 13:49

## 2021-11-26 RX ADMIN — EPHEDRINE SULFATE 5 MG: 50 INJECTION INTRAVENOUS at 14:10

## 2021-11-26 RX ADMIN — OXYCODONE HYDROCHLORIDE 5 MG: 5 TABLET ORAL at 17:54

## 2021-11-26 RX ADMIN — FENTANYL CITRATE 10 MCG: 50 INJECTION, SOLUTION INTRAMUSCULAR; INTRAVENOUS at 14:10

## 2021-11-26 RX ADMIN — METHYLERGONOVINE MALEATE 200 MCG: 0.2 INJECTION, SOLUTION INTRAMUSCULAR; INTRAVENOUS at 19:37

## 2021-11-26 RX ADMIN — SODIUM CHLORIDE, POTASSIUM CHLORIDE, SODIUM LACTATE AND CALCIUM CHLORIDE 125 ML/HR: 600; 310; 30; 20 INJECTION, SOLUTION INTRAVENOUS at 13:41

## 2021-11-26 RX ADMIN — Medication 3 ML: at 14:00

## 2021-11-26 RX ADMIN — METOCLOPRAMIDE HYDROCHLORIDE 10 MG: 5 INJECTION INTRAMUSCULAR; INTRAVENOUS at 13:48

## 2021-11-26 RX ADMIN — CARBOPROST TROMETHAMINE 250 MCG: 250 INJECTION, SOLUTION INTRAMUSCULAR at 16:39

## 2021-11-26 RX ADMIN — MORPHINE SULFATE 0.15 MG: 0.5 INJECTION, SOLUTION EPIDURAL; INTRATHECAL; INTRAVENOUS at 14:10

## 2021-11-26 RX ADMIN — METHYLERGONOVINE MALEATE 200 MCG: 0.2 INJECTION, SOLUTION INTRAMUSCULAR; INTRAVENOUS at 16:46

## 2021-11-26 RX ADMIN — MISOPROSTOL 200 MCG: 200 TABLET ORAL at 19:37

## 2021-11-26 RX ADMIN — KETOROLAC TROMETHAMINE 30 MG: 30 INJECTION, SOLUTION INTRAMUSCULAR; INTRAVENOUS at 19:37

## 2021-11-26 RX ADMIN — ONDANSETRON 4 MG: 2 INJECTION INTRAMUSCULAR; INTRAVENOUS at 22:35

## 2021-11-26 RX ADMIN — SODIUM CHLORIDE, POTASSIUM CHLORIDE, SODIUM LACTATE AND CALCIUM CHLORIDE 125 ML/HR: 600; 310; 30; 20 INJECTION, SOLUTION INTRAVENOUS at 18:45

## 2021-11-26 RX ADMIN — FAMOTIDINE 20 MG: 10 INJECTION INTRAVENOUS at 13:48

## 2021-11-26 RX ADMIN — TRANEXAMIC ACID 1000 MG: 100 INJECTION, SOLUTION INTRAVENOUS at 16:12

## 2021-11-26 RX ADMIN — KETOROLAC TROMETHAMINE 30 MG: 30 INJECTION, SOLUTION INTRAMUSCULAR; INTRAVENOUS at 14:45

## 2021-11-26 RX ADMIN — SODIUM CITRATE AND CITRIC ACID MONOHYDRATE 30 ML: 500; 334 SOLUTION ORAL at 13:48

## 2021-11-26 RX ADMIN — BUPIVACAINE HYDROCHLORIDE 1.6 ML: 7.5 INJECTION, SOLUTION EPIDURAL; RETROBULBAR at 14:10

## 2021-11-26 RX ADMIN — SODIUM CHLORIDE, POTASSIUM CHLORIDE, SODIUM LACTATE AND CALCIUM CHLORIDE: 600; 310; 30; 20 INJECTION, SOLUTION INTRAVENOUS at 14:16

## 2021-11-26 RX ADMIN — CEFAZOLIN SODIUM 2 G: 2 INJECTION, SOLUTION INTRAVENOUS at 13:55

## 2021-11-26 RX ADMIN — DIPHENOXYLATE HYDROCHLORIDE AND ATROPINE SULFATE 1 TABLET: 2.5; .025 TABLET ORAL at 17:57

## 2021-11-26 RX ADMIN — EPHEDRINE SULFATE 10 MG: 50 INJECTION INTRAVENOUS at 14:07

## 2021-11-26 RX ADMIN — OXYTOCIN 125 ML/HR: 10 INJECTION, SOLUTION INTRAMUSCULAR; INTRAVENOUS at 16:37

## 2021-11-26 RX ADMIN — CARBOPROST TROMETHAMINE 250 MCG: 250 INJECTION, SOLUTION INTRAMUSCULAR at 16:14

## 2021-11-26 RX ADMIN — OXYTOCIN 40 UNITS: 10 INJECTION, SOLUTION INTRAMUSCULAR; INTRAVENOUS at 14:23

## 2021-11-26 RX ADMIN — ONDANSETRON 4 MG: 2 INJECTION INTRAMUSCULAR; INTRAVENOUS at 16:24

## 2021-11-26 RX ADMIN — Medication 50 MCG: at 14:17

## 2021-11-26 RX ADMIN — Medication 50 MCG: at 14:12

## 2021-11-26 RX ADMIN — EPHEDRINE SULFATE 5 MG: 50 INJECTION INTRAVENOUS at 14:09

## 2021-11-26 NOTE — ANESTHESIA PREPROCEDURE EVALUATION
Anesthesia Evaluation     Patient summary reviewed and Nursing notes reviewed   no history of anesthetic complications:  NPO Solid Status: > 8 hours  NPO Liquid Status: > 2 hours           Airway   Mallampati: II  TM distance: >3 FB  Neck ROM: full  No difficulty expected  Dental      Pulmonary - negative pulmonary ROS and normal exam    breath sounds clear to auscultation  Cardiovascular - normal exam  Exercise tolerance: good (4-7 METS)    Rhythm: regular    (+) hypertension,       Neuro/Psych- negative ROS  GI/Hepatic/Renal/Endo - negative ROS     Musculoskeletal (-) negative ROS    Abdominal    Substance History - negative use     OB/GYN    (+) Pregnant, pregnancy induced hypertension        Other - negative ROS              Results for MONICA PUENTE (MRN [id number]) as of 11/26/2021 13:15   Ref. Range 11/19/2021 12:02   Glucose Latest Ref Range: 65 - 99 mg/dL 69   Sodium Latest Ref Range: 136 - 145 mmol/L 138   Potassium Latest Ref Range: 3.5 - 5.2 mmol/L 3.8   CO2 Latest Ref Range: 22.0 - 29.0 mmol/L 23.7   Chloride Latest Ref Range: 98 - 107 mmol/L 104   Anion Gap Latest Ref Range: 5.0 - 15.0 mmol/L 10.3   Creatinine Latest Ref Range: 0.57 - 1.00 mg/dL 0.62   BUN Latest Ref Range: 6 - 20 mg/dL 11   BUN/Creatinine Ratio Latest Ref Range: 7.0 - 25.0  17.7   Calcium Latest Ref Range: 8.6 - 10.5 mg/dL 9.4   eGFR Non  Am Latest Ref Range: >60 mL/min/1.73 123   Alkaline Phosphatase Latest Ref Range: 39 - 117 U/L 165 (H)   Total Protein Latest Ref Range: 6.0 - 8.5 g/dL 6.0   ALT (SGPT) Latest Ref Range: 1 - 33 U/L 13   AST (SGOT) Latest Ref Range: 1 - 32 U/L 21   Total Bilirubin Latest Ref Range: 0.0 - 1.2 mg/dL 0.3   Albumin Latest Ref Range: 3.50 - 5.20 g/dL 3.20 (L)   Globulin Latest Units: gm/dL 2.8   A/G Ratio Latest Units: g/dL 1.1     Results for MONICA PUENTE (MRN [id number]) as of 11/26/2021 13:15   Ref. Range 11/19/2021 12:02   Hemoglobin Latest Ref Range: 12.0 - 15.9 g/dL 10.1  (L)   Hematocrit Latest Ref Range: 34.0 - 46.6 % 31.7 (L)     Results for MONICA PUENTE (MRN [id number]) as of 11/26/2021 13:15   Ref. Range 11/19/2021 12:02   Platelets Latest Ref Range: 140 - 450 10*3/mm3 193                  Anesthesia Plan    ASA 2     spinal   (Twins gestation )    Anesthetic plan, all risks, benefits, and alternatives have been provided, discussed and informed consent has been obtained with: patient.    Plan discussed with CRNA.

## 2021-11-26 NOTE — ANESTHESIA PROCEDURE NOTES
Spinal Block      Patient location during procedure: OR  Indication:procedure for pain  Performed By  CRNA: Sybil Obrien CRNA  Preanesthetic Checklist  Completed: patient identified, IV checked, site marked, risks and benefits discussed, surgical consent, monitors and equipment checked, pre-op evaluation and timeout performed  Spinal Block Prep:  Patient Position:sitting  Sterile Tech:cap, gloves, mask and sterile barriers  Prep:Chloraprep  Patient Monitoring:blood pressure monitoring and continuous pulse oximetry  Spinal Block Procedure  Approach:midline  Guidance:landmark technique  Location:L3-L4  Needle Type:Pencan  Needle Gauge:24 G  Placement of Spinal needle event:cerebrospinal fluid aspirated  Paresthesia: transient  Fluid Appearance:clear  Medications: morphine PF (DURAMORPH) injection, 0.15 mg  fentaNYL (SUBLIMAZE) injection, 10 mcg  bupivacaine PF (MARCAINE) injection 0.75%, 1.6 mL   Post Assessment  Patient Tolerance:patient tolerated the procedure well with no apparent complications  Complications no

## 2021-11-27 LAB
BASOPHILS # BLD AUTO: 0.08 10*3/MM3 (ref 0–0.2)
BASOPHILS NFR BLD AUTO: 0.4 % (ref 0–1.5)
DEPRECATED RDW RBC AUTO: 43.8 FL (ref 37–54)
EOSINOPHIL # BLD AUTO: 0.07 10*3/MM3 (ref 0–0.4)
EOSINOPHIL NFR BLD AUTO: 0.3 % (ref 0.3–6.2)
ERYTHROCYTE [DISTWIDTH] IN BLOOD BY AUTOMATED COUNT: 14.5 % (ref 12.3–15.4)
HCT VFR BLD AUTO: 30.5 % (ref 34–46.6)
HGB BLD-MCNC: 9.9 G/DL (ref 12–15.9)
IMM GRANULOCYTES # BLD AUTO: 0.17 10*3/MM3 (ref 0–0.05)
IMM GRANULOCYTES NFR BLD AUTO: 0.8 % (ref 0–0.5)
LYMPHOCYTES # BLD AUTO: 2.03 10*3/MM3 (ref 0.7–3.1)
LYMPHOCYTES NFR BLD AUTO: 9.3 % (ref 19.6–45.3)
MCH RBC QN AUTO: 27.7 PG (ref 26.6–33)
MCHC RBC AUTO-ENTMCNC: 32.5 G/DL (ref 31.5–35.7)
MCV RBC AUTO: 85.4 FL (ref 79–97)
MONOCYTES # BLD AUTO: 1.01 10*3/MM3 (ref 0.1–0.9)
MONOCYTES NFR BLD AUTO: 4.6 % (ref 5–12)
NEUTROPHILS NFR BLD AUTO: 18.52 10*3/MM3 (ref 1.7–7)
NEUTROPHILS NFR BLD AUTO: 84.6 % (ref 42.7–76)
NRBC BLD AUTO-RTO: 0 /100 WBC (ref 0–0.2)
PLATELET # BLD AUTO: 172 10*3/MM3 (ref 140–450)
PMV BLD AUTO: 13.1 FL (ref 6–12)
RBC # BLD AUTO: 3.57 10*6/MM3 (ref 3.77–5.28)
WBC NRBC COR # BLD: 21.88 10*3/MM3 (ref 3.4–10.8)

## 2021-11-27 PROCEDURE — 25010000002 KETOROLAC TROMETHAMINE PER 15 MG: Performed by: NURSE ANESTHETIST, CERTIFIED REGISTERED

## 2021-11-27 PROCEDURE — 25010000002 ONDANSETRON PER 1 MG: Performed by: NURSE ANESTHETIST, CERTIFIED REGISTERED

## 2021-11-27 PROCEDURE — 0503F POSTPARTUM CARE VISIT: CPT | Performed by: OBSTETRICS & GYNECOLOGY

## 2021-11-27 PROCEDURE — 25010000002 MORPHINE PER 10 MG: Performed by: NURSE ANESTHETIST, CERTIFIED REGISTERED

## 2021-11-27 PROCEDURE — 85025 COMPLETE CBC W/AUTO DIFF WBC: CPT | Performed by: OBSTETRICS & GYNECOLOGY

## 2021-11-27 RX ORDER — IBUPROFEN 600 MG/1
600 TABLET ORAL EVERY 6 HOURS SCHEDULED
Status: DISCONTINUED | OUTPATIENT
Start: 2021-11-27 | End: 2021-11-28

## 2021-11-27 RX ADMIN — HYDROCODONE BITARTRATE AND ACETAMINOPHEN 1 TABLET: 5; 325 TABLET ORAL at 07:54

## 2021-11-27 RX ADMIN — ONDANSETRON 4 MG: 2 INJECTION INTRAMUSCULAR; INTRAVENOUS at 08:03

## 2021-11-27 RX ADMIN — MORPHINE SULFATE 2 MG: 4 INJECTION, SOLUTION INTRAMUSCULAR; INTRAVENOUS at 12:42

## 2021-11-27 RX ADMIN — ONDANSETRON 4 MG: 2 INJECTION INTRAMUSCULAR; INTRAVENOUS at 14:10

## 2021-11-27 RX ADMIN — IBUPROFEN 600 MG: 600 TABLET ORAL at 14:10

## 2021-11-27 RX ADMIN — IBUPROFEN 600 MG: 600 TABLET ORAL at 20:17

## 2021-11-27 RX ADMIN — KETOROLAC TROMETHAMINE 30 MG: 30 INJECTION, SOLUTION INTRAMUSCULAR; INTRAVENOUS at 07:54

## 2021-11-27 RX ADMIN — HYDROCODONE BITARTRATE AND ACETAMINOPHEN 1 TABLET: 5; 325 TABLET ORAL at 17:50

## 2021-11-27 RX ADMIN — KETOROLAC TROMETHAMINE 30 MG: 30 INJECTION, SOLUTION INTRAMUSCULAR; INTRAVENOUS at 01:30

## 2021-11-27 RX ADMIN — SODIUM CHLORIDE, POTASSIUM CHLORIDE, SODIUM LACTATE AND CALCIUM CHLORIDE 125 ML/HR: 600; 310; 30; 20 INJECTION, SOLUTION INTRAVENOUS at 03:30

## 2021-11-27 NOTE — ANESTHESIA POSTPROCEDURE EVALUATION
Patient: Rajani Gomez    Procedure Summary     Date: 21 Room / Location: [facility] LABOR DELIVERY  [facility] LABOR DELIVERY    Anesthesia Start: 1359 Anesthesia Stop: 1500    Procedure:  SECTION PRIMARY (N/A Abdomen) Diagnosis: (PRIMARY; TWINS; BABY A BREECH)    Surgeons: Kwame Abernathy Sr., MD Provider: Orlando Islas MD    Anesthesia Type: spinal ASA Status: 2          Anesthesia Type: spinal    Vitals  Vitals Value Taken Time   /77 21   Temp 36.6 °C (97.8 °F) 21   Pulse 87 21   Resp 18 21   SpO2 96 % 21 1822   Vitals shown include unvalidated device data.        Post Anesthesia Care and Evaluation    Patient location during evaluation: bedside  Patient participation: complete - patient participated  Level of consciousness: awake  Pain score: 0  Pain management: adequate  Airway patency: patent  Anesthetic complications: No anesthetic complications  PONV Status: none  Cardiovascular status: acceptable and stable  Respiratory status: acceptable and room air  Hydration status: acceptable  Post Neuraxial Block status: Motor and sensory function returned to baseline and No signs or symptoms of PDPH

## 2021-11-28 VITALS
OXYGEN SATURATION: 97 % | HEART RATE: 116 BPM | TEMPERATURE: 97.9 F | RESPIRATION RATE: 18 BRPM | DIASTOLIC BLOOD PRESSURE: 80 MMHG | HEIGHT: 68 IN | SYSTOLIC BLOOD PRESSURE: 136 MMHG | WEIGHT: 177 LBS | BODY MASS INDEX: 26.83 KG/M2

## 2021-11-28 PROCEDURE — 0503F POSTPARTUM CARE VISIT: CPT | Performed by: OBSTETRICS & GYNECOLOGY

## 2021-11-28 RX ORDER — IBUPROFEN 600 MG/1
600 TABLET ORAL EVERY 6 HOURS
Status: DISCONTINUED | OUTPATIENT
Start: 2021-11-28 | End: 2021-11-28 | Stop reason: HOSPADM

## 2021-11-28 RX ADMIN — IBUPROFEN 600 MG: 600 TABLET ORAL at 10:13

## 2021-11-28 RX ADMIN — IBUPROFEN 600 MG: 600 TABLET ORAL at 02:50

## 2021-11-29 ENCOUNTER — APPOINTMENT (OUTPATIENT)
Dept: LAB | Facility: HOSPITAL | Age: 21
End: 2021-11-29

## 2022-01-13 ENCOUNTER — POSTPARTUM VISIT (OUTPATIENT)
Dept: OBSTETRICS AND GYNECOLOGY | Facility: CLINIC | Age: 22
End: 2022-01-13

## 2022-01-13 VITALS
BODY MASS INDEX: 23.49 KG/M2 | WEIGHT: 155 LBS | SYSTOLIC BLOOD PRESSURE: 131 MMHG | HEART RATE: 74 BPM | DIASTOLIC BLOOD PRESSURE: 74 MMHG | HEIGHT: 68 IN

## 2022-01-13 PROCEDURE — 99213 OFFICE O/P EST LOW 20 MIN: CPT | Performed by: OBSTETRICS & GYNECOLOGY

## 2022-01-13 RX ORDER — NORGESTIMATE AND ETHINYL ESTRADIOL 7DAYSX3 28
1 KIT ORAL DAILY
Qty: 84 TABLET | Refills: 4 | Status: SHIPPED | OUTPATIENT
Start: 2022-01-13 | End: 2023-02-09

## 2022-01-13 NOTE — PROGRESS NOTES
"POSTPARTUM Follow Up Visit    CC: Postpartum FU  Chief Complaint   Patient presents with   • Postpartum Care     Postpartum visit after twin delivery by      HPI:    Antepartum or Postpartum complications: Gestational hypertension, Twins, nonvertex presentation  • Date of delivery: 2021   • Delivery type:            Perineum: NA  • Delivering Provider:   Dr. Kwame Abernathy      • Feeding: Bottle bottlefeeding  • Pain:  No  • Vaginal Bleeding:  No  • Depressed/Anxious:  No  EPDS score: 2   #10: 0  • Plans for BC:  OCP (estrogen/progesterone)  • Weight at last OB OV:  177lb  Last Completed Pap Smear     This patient has no relevant Health Maintenance data.          PHYSICAL EXAM:  /74   Pulse 74   Ht 172.7 cm (68\")   Wt 70.3 kg (155 lb)   LMP 2021   Breastfeeding No   BMI 23.57 kg/m²  20 kg (44 lb)  General- NAD, alert and oriented, appropriate  Psych- Normal mood, good memory  Incision well-healed, very small keloid  Abdomen- Soft, non distended, non tender, no masses    External genitalia- Normal.    Urethra/Bladder/Vagina- Normal, no masses, non-tender, no prolapse     Cvx- Normal, no lesions, no discharge, no CMT  Uterus- Nontender  Adnexa- Normal, no mass, non-tender    Lymphatic- No palpable groin nodes  Ext- No edema, no cyanosis   Skin- No lesions, no rashes, no acanthosis nigricans    ASSESSMENT AND PLAN:  Diagnoses and all orders for this visit:    1. Postpartum follow-up (Primary)  -     norgestimate-ethinyl estradiol (Ortho Tri-Cyclen, 28,) 0.18/0.215/0.25 MG-35 MCG per tablet; Take 1 tablet by mouth Daily.  Dispense: 84 tablet; Refill: 4        Counseling:    • May resume intercourse  • May resume normal activities    Follow Up:  No follow-ups on file.  Pap due at age 22        Kwame Abernathy Sr., MD  2022    [facility]  [facility] [place] [address]  Dept: [phone]  Dept Fax: [phone]  Loc: " [phone]  Loc Fax: [phone]

## 2023-02-09 RX ORDER — NORGESTIMATE AND ETHINYL ESTRADIOL 7DAYSX3 28
KIT ORAL
Qty: 84 TABLET | Refills: 0 | Status: SHIPPED | OUTPATIENT
Start: 2023-02-09

## 2023-02-09 NOTE — TELEPHONE ENCOUNTER
Approved additional refill on tri sprintec. Left pt a voicemail letting her know she needs to call to schedule an appt.

## 2023-02-28 ENCOUNTER — TELEPHONE (OUTPATIENT)
Dept: OBSTETRICS AND GYNECOLOGY | Facility: CLINIC | Age: 23
End: 2023-02-28

## 2023-02-28 NOTE — TELEPHONE ENCOUNTER
LEFT VOICE MAIL FOR PATIENT TO RETURN CALL CONCERNING THE APPT FOR 3/2.  DAVE VIJAY WON'T BE IN OFFICE, AND THE APPT NEEDS TO BE RESCHEDULED.

## 2023-05-03 NOTE — PROGRESS NOTES
GYN Problem/Follow Up Visit    Chief Complaint   Patient presents with   • Follow-up     F/U BCP, stopped taking 2 months ago, wants to discuss IUD.           HPI  Rajani Gomez is a 22 y.o. female, , who presents to discuss contraceptive management, reports previous use ocp, stopped 3 months ago d/t mood changes- tearfulness, sensitivity, irritability. Since mood normalized and menses regular monthly, lasting 5-7 days, on heavy days change products every 2 hours. Mild menstrual cramps.    Desires LARC    Additional OB/GYN History   Patient's last menstrual period was 2023 (approximate).  Current contraception: contraceptive methods: Condoms    Past Medical History:   Diagnosis Date   • Anxiety    • Migraine       Past Surgical History:   Procedure Laterality Date   •  SECTION N/A 2021    Procedure:  SECTION PRIMARY;  Surgeon: Kwame Abernathy Sr., MD;  Location: [facility] LABOR DELIVERY;  Service: Gynecology;  Laterality: N/A;      Family History   Problem Relation Age of Onset   • Lung cancer Paternal Grandmother    • Diabetes Maternal Grandmother    • Ovarian cancer Neg Hx    • Uterine cancer Neg Hx    • Colon cancer Neg Hx    • Breast cancer Neg Hx    • Pulmonary embolism Neg Hx    • Deep vein thrombosis Neg Hx      Allergies as of 2023 - Reviewed 2023   Allergen Reaction Noted   • Peanut oil Nausea And Vomiting 2021   • Peanut-containing drug products Nausea And Vomiting 2021      The additional following portions of the patient's history were reviewed and updated as appropriate: allergies, current medications, past family history, past medical history, past social history, past surgical history and problem list.    Review of Systems    See HPI for pertinent ROS    Objective   /74   Pulse 78   Wt 68.5 kg (151 lb)   LMP 2023 (Approximate)   BMI 22.96 kg/m²     Physical Exam  Vitals and nursing note reviewed. Exam conducted with a  chaperone present.   Constitutional:       Appearance: Normal appearance.   Cardiovascular:      Rate and Rhythm: Normal rate.   Pulmonary:      Effort: Pulmonary effort is normal.   Genitourinary:     General: Normal vulva.      Vagina: Vaginal discharge (moderate amount thick pasty white) present.      Cervix: Normal.      Uterus: Normal.       Adnexa: Right adnexa normal and left adnexa normal.   Lymphadenopathy:      Lower Body: No right inguinal adenopathy. No left inguinal adenopathy.   Skin:     General: Skin is warm and dry.   Neurological:      Mental Status: She is alert and oriented to person, place, and time.          Assessment and Plan    Diagnoses and all orders for this visit:    1. Encounter for initial prescription of intrauterine contraceptive device (IUD) (Primary)  -     hCG, Quantitative, Pregnancy; Future  -     POC Pregnancy, Urine  -     Gardnerella vaginalis, Trichomonas vaginalis, Candida albicans, DNA - Swab, Vagina    2. Papanicolaou smear  -     IGP,rfx Aptima HPV All Pth    3. Vaginal discharge  -     Gardnerella vaginalis, Trichomonas vaginalis, Candida albicans, DNA - Swab, Vagina      HCG, Urine, QL   Date Value Ref Range Status   05/04/2023 Negative Negative Final     Counseling:  • TRACK MENSES, RTO if <q21d, >7d long, heavy or painful.    • All BIRTH CONTROL options R/B/A/SE/E of each reviewed in detail.  • desires paragard LARD, Precert for paragard    Follow Up:  Return for precert for Paragard IUD.        Reny Erwin, APRN  05/04/2023

## 2023-05-04 ENCOUNTER — OFFICE VISIT (OUTPATIENT)
Dept: OBSTETRICS AND GYNECOLOGY | Facility: CLINIC | Age: 23
End: 2023-05-04
Payer: COMMERCIAL

## 2023-05-04 VITALS
SYSTOLIC BLOOD PRESSURE: 118 MMHG | BODY MASS INDEX: 22.96 KG/M2 | HEART RATE: 78 BPM | WEIGHT: 151 LBS | DIASTOLIC BLOOD PRESSURE: 74 MMHG

## 2023-05-04 DIAGNOSIS — Z12.4 PAPANICOLAOU SMEAR: ICD-10-CM

## 2023-05-04 DIAGNOSIS — N89.8 VAGINAL DISCHARGE: ICD-10-CM

## 2023-05-04 DIAGNOSIS — Z30.014 ENCOUNTER FOR INITIAL PRESCRIPTION OF INTRAUTERINE CONTRACEPTIVE DEVICE (IUD): Primary | ICD-10-CM

## 2023-05-04 LAB
B-HCG UR QL: NEGATIVE
CANDIDA SPECIES: NEGATIVE
EXPIRATION DATE: NORMAL
GARDNERELLA VAGINALIS: POSITIVE
INTERNAL NEGATIVE CONTROL: NORMAL
INTERNAL POSITIVE CONTROL: NORMAL
Lab: NORMAL
T VAGINALIS DNA VAG QL PROBE+SIG AMP: NEGATIVE

## 2023-05-04 PROCEDURE — 87480 CANDIDA DNA DIR PROBE: CPT | Performed by: NURSE PRACTITIONER

## 2023-05-04 PROCEDURE — G0123 SCREEN CERV/VAG THIN LAYER: HCPCS | Performed by: NURSE PRACTITIONER

## 2023-05-04 PROCEDURE — 87660 TRICHOMONAS VAGIN DIR PROBE: CPT | Performed by: NURSE PRACTITIONER

## 2023-05-04 PROCEDURE — 87510 GARDNER VAG DNA DIR PROBE: CPT | Performed by: NURSE PRACTITIONER

## 2023-05-05 DIAGNOSIS — N76.0 BV (BACTERIAL VAGINOSIS): Primary | ICD-10-CM

## 2023-05-05 DIAGNOSIS — B96.89 BV (BACTERIAL VAGINOSIS): Primary | ICD-10-CM

## 2023-05-05 RX ORDER — METRONIDAZOLE 65 MG/5G
1 GEL TOPICAL
Qty: 5 G | Refills: 0 | Status: SHIPPED | OUTPATIENT
Start: 2023-05-05 | End: 2023-05-05

## 2023-05-14 LAB
CONV .: NORMAL
CYTOLOGIST CVX/VAG CYTO: NORMAL
CYTOLOGY CVX/VAG DOC CYTO: NORMAL
CYTOLOGY CVX/VAG DOC THIN PREP: NORMAL
DX ICD CODE: NORMAL
HIV 1 & 2 AB SER-IMP: NORMAL
Lab: NORMAL
OTHER STN SPEC: NORMAL
STAT OF ADQ CVX/VAG CYTO-IMP: NORMAL

## 2024-09-10 ENCOUNTER — LAB (OUTPATIENT)
Dept: LAB | Facility: HOSPITAL | Age: 24
End: 2024-09-10
Payer: COMMERCIAL

## 2024-09-10 ENCOUNTER — OFFICE VISIT (OUTPATIENT)
Dept: PSYCHIATRY | Facility: CLINIC | Age: 24
End: 2024-09-10
Payer: COMMERCIAL

## 2024-09-10 VITALS
SYSTOLIC BLOOD PRESSURE: 128 MMHG | DIASTOLIC BLOOD PRESSURE: 95 MMHG | HEART RATE: 105 BPM | WEIGHT: 151 LBS | BODY MASS INDEX: 22.88 KG/M2 | HEIGHT: 68 IN

## 2024-09-10 DIAGNOSIS — F90.0 ADHD, PREDOMINANTLY INATTENTIVE TYPE: ICD-10-CM

## 2024-09-10 DIAGNOSIS — F41.1 GENERALIZED ANXIETY DISORDER: Primary | ICD-10-CM

## 2024-09-10 LAB
AMPHET+METHAMPHET UR QL: NEGATIVE
BARBITURATES UR QL SCN: NEGATIVE
BENZODIAZ UR QL SCN: NEGATIVE
CANNABINOIDS SERPL QL: NEGATIVE
COCAINE UR QL: NEGATIVE
FENTANYL UR-MCNC: NEGATIVE NG/ML
METHADONE UR QL SCN: NEGATIVE
OPIATES UR QL: NEGATIVE
OXYCODONE UR QL SCN: NEGATIVE

## 2024-09-10 PROCEDURE — 80307 DRUG TEST PRSMV CHEM ANLYZR: CPT

## 2024-09-10 PROCEDURE — 90792 PSYCH DIAG EVAL W/MED SRVCS: CPT

## 2024-09-10 RX ORDER — BUSPIRONE HYDROCHLORIDE 5 MG/1
5 TABLET ORAL
COMMUNITY
Start: 2024-07-11 | End: 2024-09-10

## 2024-09-10 NOTE — TREATMENT PLAN
Multi-Disciplinary Problems (from Behavioral Health Treatment Plan)      Active Problems       Problem: ADHD (Adult)  Start Date: 09/10/24      Problem Details: The patient self-scales this problem as a 4 with 10 being the worst.        Goal Priority Start Date Expected End Date End Date    Patient will sustain attention and concentration to complete chores, and work responsibilites and increase positive interaction in all relationships. -- 09/10/24 03/11/25 --    Goal Details: Progress toward goal:  The patient self-scales their progress related to this goal as a 4 with 10 being the worst.        Goal Intervention Frequency Start Date End Date    Assist patient in setting responsible goals and breaking down large tasks. Weekly 09/10/24 --    Intervention Details: Duration of treatment until remission of symptoms.        Goal Intervention Frequency Start Date End Date    Assist patient in using self monitoring checklist to improve attention, work performance, and social skills. Weekly 09/10/24 --    Intervention Details: Duration of treatment until remission of symptoms.                        Reviewed By       Margret Dumont APRN 09/10/24 2336                     I have discussed and reviewed this treatment plan with the patient.  It has been printed for signatures.

## 2024-09-10 NOTE — PROGRESS NOTES
"[facility]  Initial Evaluation    Referring Provider:  Grace Luque, APRN  [address] [facility]  [address] [place] [id number]    Chief Complaint: \"I have been having some anxiety issues and I think I have ADHD\"    History of Present Illness: Rajani Gomez is a 24 y.o. female who presents today for initial evaluation regarding ADHD and anxiety consultation. Rajani presents unaccompanied in no acute distress and engages with me appropriately. Psychotropic regimen with which patient presents is described as buspirone 5 mg po TID, as needed, but Rajani stopped due to N/V.     History is positive for signs/symptoms suggestive of consistent and excessive worry across several domains of life that contributes to tension and irritability throughout the day.     Rajani has had a history trouble concentrating, trouble completing tasks, making errors in routine tasks, issues remembering obligations, trouble with organization, fidgeting, and associated irritability/anxiety with these deficits.    With regard to ADHD: I am presumptively treating patient for this issue; history as provided today, presentation today, and positive family history would suggest a distinct possibility this is a contributing factor to patient's dysfunction in life roles and engagements irrespective to screening results. Patient has learned and successfully implemented compensatory coping skills that, with the Moravian of layered stressors throughout adulthood, have begun to fail. Treating ADHD symptoms will likely be a concise and appropriate route to address anxiety and mood issues that are, at least to some degree, secondary to deficits related to traits of ADHD.     ASRS-v1.1  Part A  5/6  Part B  7/12    Total  12/18     Psychiatric screening is negative for pathognomonic history of TBI, seizures, keily, psychosis, violence, and suicidality.     We discussed non-stimulant ADHD agents vs. " Stimulant agents-see below    Non-stimulants-clonidine/guanfacine off label use-can lower BP, be sedating, and cause dizziness., bupropion XL off-label can increase BP, increase anxiety, dry mouth/dry eyes, and mood changes-known to lower seizure threshold OR atomoxetine-FDA indicated ADHD, Qelbree-FDA indicated for ADHD-takes longer for full effects, contribute to issues with BP and HR, appetite suppression, insomnia, and mood changes with worst case scenario being new-onset SI/mood changes.     Stimulants:  Patient has been made aware of the long-term risks of tachyphylaxis/dependence and uncomfortable withdrawal that may occur with abrupt discontinuation of this agent. I have advised taking medication holidays to mitigate risk of dependence and tolerance and have advised the patient with regard to common psychological dependence that can contribute to perceived diminishment in treatment effectiveness. Patient has been advised to monitor and limit caffeine intake while taking this agent. Patient has been made aware of common SE - diminished appetite, insomnia, hypertension - for which this agent has propensity. I have specifically reviewed issues related to misuse and diversion with the patient today.  Please keep this medication locked in a secure location, and away from children    I have counseled the patient with regard to diagnoses and the recommended treatment regimen as documented below: I will assume prescriptive responsibility for ADHD stimulant.  Offered to prescribe clonidine 0.1 mg p.o. twice daily for anxiety, patient wishes not to start anything at this time and will reach out should she feel that her anxiety has worsened .  Patient verbalized understanding that ADHD management can often increase anxiety and she has been advised to reach out should that occur .  The patient acknowledges the diagnoses per my rendered interpretation. Patient demonstrates awareness/understanding of viable alternatives  for treatment as well as potential risks, benefits, and side effects associated with this regimen and is amenable to proceed in this fashion.     Recommended lifestyle changes: 30 minutes of activity to increase HR 2-3 days weekly.    Psychiatric History:  Diagnoses: none  Outpatient history: none  Inpatient history: none  Medication trials: buspirone (nausea) stopped making  Other treatment modalities: none  Self harm: No history  Suicide attempts: No  Abuse or neglect: None    Substance Use History:   Types/methods/frequency: *marijuana   Transtheoretical stage: Maintenence    Social History:  Residence: lives with partner, house, two children 3 in November  Vocation: no  Source of income: none  Last grade completed: 12 th  Pertinent developmental history: some concentration   Pertinent legal history: No history   Hobbies/interests: Rekoo games, sean  Quaker: N/A  Exercise:walking 40 minutes/day  5 days/week   Dietary habits: Peanut allergy  Sleep hygiene: variable, listens to ASMR  Social habits: feels she is well supported,   Sunlight: There are no concern for under-exposure.  Caffeine intake: mindful intake  Hydration habits: no pertinent issues    history: No    Social History     Socioeconomic History    Marital status: Single     Spouse name: Mckay    Highest education level: High school graduate   Tobacco Use    Smoking status: Never    Smokeless tobacco: Never   Substance and Sexual Activity    Alcohol use: Never    Drug use: Never    Sexual activity: Yes     Partners: Male     Birth control/protection: None     Access to Firearms: yes, in a safe    Tobacco use counseling/intervention: N/A, patient does not use tobacco    PHQ-9 Depression Screening  PHQ-9 Total Score:  15    Little interest or pleasure in doing things? 0-->not at all   Feeling down, depressed, or hopeless?     Trouble falling or staying asleep, or sleeping too much? 3-->nearly every day   Feeling tired or having little  energy? 3-->nearly every day   Poor appetite or overeating? 3-->nearly every day   Feeling bad about yourself - or that you are a failure or have let yourself or your family down? 0-->not at all   Trouble concentrating on things, such as reading the newspaper or watching television? 3-->nearly every day   Moving or speaking so slowly that other people could have noticed? Or the opposite - being so fidgety or restless that you have been moving around a lot more than usual? 3-->nearly every day   Thoughts that you would be better off dead, or of hurting yourself in some way? 0-->not at all   PHQ-9 Total Score  15     ROCAEL-7  Feeling nervous, anxious or on edge: Several days  Not being able to stop or control worrying: Several days  Worrying too much about different things: Nearly every day  Trouble Relaxing: Nearly every day  Being so restless that it is hard to sit still: Nearly every day  Feeling afraid as if something awful might happen: Several days  Becoming easily annoyed or irritable: Several days  ROCAEL 7 Total Score: 13  If you checked any problems, how difficult have these problems made it for you to do your work, take care of things at home, or get along with other people: Somewhat difficult    Problem List:  Patient Active Problem List   Diagnosis    Gestational hypertension w/o significant proteinuria in 3rd trimester    Breech presentation of fetus    Postpartum follow-up     Allergy:   Allergies   Allergen Reactions    Peanut Oil Nausea And Vomiting    Peanut-Containing Drug Products Nausea And Vomiting        Discontinued Medications:  There are no discontinued medications.    Current Medications:   Current Outpatient Medications   Medication Sig Dispense Refill    busPIRone (BUSPAR) 5 MG tablet Take 1 tablet by mouth. (Patient not taking: Reported on 9/10/2024)      Tri-Sprintec 0.18/0.215/0.25 MG-35 MCG per tablet TAKE 1 TABLET BY MOUTH DAILY (Patient not taking: Reported on 5/4/2023) 84 tablet 0     No  "current facility-administered medications for this visit.     Past Medical History:  Past Medical History:   Diagnosis Date    Anxiety     Migraine      Past Surgical History:  Past Surgical History:   Procedure Laterality Date     SECTION N/A 2021    Procedure:  SECTION PRIMARY;  Surgeon: Kwame Abernathy Sr., MD;  Location: [facility] LABOR DELIVERY;  Service: Gynecology;  Laterality: N/A;     Family History:   Family History   Problem Relation Age of Onset    Lung cancer Paternal Grandmother     Diabetes Maternal Grandmother     ADD / ADHD Mother     Depression Mother     Ovarian cancer Neg Hx     Uterine cancer Neg Hx     Colon cancer Neg Hx     Breast cancer Neg Hx     Pulmonary embolism Neg Hx     Deep vein thrombosis Neg Hx        Mental Status Exam:   Observations:  Appearance: Neat  Speech: Normal  Eye Contact: Normal  Motor Activity: Normal  Affect:Full  Comments:  Mood:Mood: Euthymic  Cognition  Orientation Impairment: None  Memory Impairment: None  Attention: Normal  Comments:  Perception  Hallucinations:None  Other:   Comments:  Thoughts:  Suicidality:None  Homicidality:None  Delusions:  None  Comments:  Behavior:Behavior: Cooperative  Insight: Insight: Good  Judgement:Insight: Good    Vital Signs:   /95   Pulse 105   Ht 172.7 cm (68\")   Wt 68.5 kg (151 lb)   BMI 22.96 kg/m²    Lab Results:   No visits with results within 6 Month(s) from this visit.   Latest known visit with results is:   Office Visit on 2023   Component Date Value Ref Range Status    Diagnosis 2023 Comment   Final    NEGATIVE FOR INTRAEPITHELIAL LESION OR MALIGNANCY.  THIS SPECIMEN WAS RESCREENED AS PART OF OUR  PROGRAM.    Specimen adequacy: 2023 Comment   Final    Satisfactory for evaluation.  Endocervical and/or squamous metaplastic  cells (endocervical component) are present.    Clinician Provided ICD-10: 2023 Comment   Final    Z12.4    Performed by: 2023 " Comment   Final    Ez Orozco Cytotechnologist (ASCP)    QC reviewed by: 05/04/2023 Comment   Final    Adelita Yeh Cytotechnologist    . 05/04/2023 .   Final    Note: 05/04/2023 Comment   Final    The Pap smear is a screening test designed to aid in the detection of  premalignant and malignant conditions of the uterine cervix.  It is not a  diagnostic procedure and should not be used as the sole means of detecting  cervical cancer.  Both false-positive and false-negative reports do occur.    Method: 05/04/2023 Comment   Final    This liquid based ThinPrep(R) pap test was screened with the  use of an image guided system.    Conv .conv 05/04/2023 Comment   Final    The HPV DNA reflex criteria were not met with this specimen result  therefore, no HPV testing was performed.    HCG, Urine, QL 05/04/2023 Negative  Negative Final    Lot Number 05/04/2023 [id number]   Final    Internal Positive Control 05/04/2023 Passed  Positive, Passed Final    Internal Negative Control 05/04/2023 Passed  Negative, Passed Final    Expiration Date 05/04/2023 5/31/2024   Final    GARDNERELLA VAGINALIS 05/04/2023 Positive (A)  Negative Final    TRICHOMONAS VAGINALIS 05/04/2023 Negative  Negative Final    JIGNA SPECIES 05/04/2023 Negative  Negative Final       ASSESSMENT AND PLAN:    ICD-10-CM ICD-9-CM   1. Generalized anxiety disorder  F41.1 300.02   2. ADHD, predominantly inattentive type  F90.0 314.00       Rajani who presents today for initial evaluation regarding medication evaluation . We have discussed the history and interpreted diagnoses as above as well as the treatment plan below, including potential R/B/SE of the recommended regimen of which the patient demonstrates understanding. Patient is agreeable to call 911 or go to the nearest ER should she become concerned for her own safety and/or the safety of those around her. There are not overt indices of acute keily/psychosis on evaluation today.     Medication regimen: Plan  to start Adderall 5 mg p.o. every morning; patient is advised not to misuse prescribed medications or to use any exogenous substances that aren't disclosed to this provider as they may interact with the regimen to her detriment.   Risk Assessment: protracted risk is moderate, imminent risk is moderate low.  Risk factors include:  disorder, anxiety disorder, mood disorder, and recent/ongoing psychosocial stressors. Protective factors include: no known family history of suicidality, intact reality testing, no substance use disorder, , no present SI, no stated history of suicide attempts or self-harm, patient's exhibited future-orientation, strong social support, and patient's cooperation with care. Do note that this is subject to change with the Yarsanism of new stressors, treatment non-adherence, use of substances, and/or new medical ails.  Monitoring: reviewed labs/imaging as populated above, UDS ordered; PHQ-9 today is PHQ-9 Total Score: 15  /27, ROCAEL-7 today is 13/21  Therapy: Deferred  Follow-up: 1 month  Communications: N/A    TREATMENT PLAN/GOALS: challenge patterns of living conducive to symptom burden, implement recommended regimen as above with augmentative, intermittent supportive psychotherapy to reduce symptom burden. Patient acknowledged and verbally consented to begin treatment as above. The importance of adherence to the recommended treatment and interval follow-up appointments was emphasized today. Patient was today advised to limit daily caffeine intake, hydrate appropriately, eat healthy and nutritious foods, engage sleep hygiene measures, engage appropriate exposure to sunlight, engage with hobbies in balance with life necessities, and exercise appropriate to their capacity to do so.     Billing: I have seen the patient today and considered her psychiatric complaints, rendered a diagnosis, and discussed treatment with the patient as above with which she consents.    Parts of this note are electronic  transcriptions/translations of spoken language to printed text using the Dragon Dictation system.    Electronically signed by GI Dove, 09/10/24,

## 2024-09-23 DIAGNOSIS — F90.0 ADHD, PREDOMINANTLY INATTENTIVE TYPE: Primary | ICD-10-CM

## 2024-09-23 RX ORDER — DEXTROAMPHETAMINE SACCHARATE, AMPHETAMINE ASPARTATE, DEXTROAMPHETAMINE SULFATE AND AMPHETAMINE SULFATE 1.25; 1.25; 1.25; 1.25 MG/1; MG/1; MG/1; MG/1
5 TABLET ORAL DAILY
Qty: 30 TABLET | Refills: 0 | Status: SHIPPED | OUTPATIENT
Start: 2024-09-23 | End: 2024-10-23

## 2024-10-10 ENCOUNTER — OFFICE VISIT (OUTPATIENT)
Dept: PSYCHIATRY | Facility: CLINIC | Age: 24
End: 2024-10-10
Payer: COMMERCIAL

## 2024-10-10 VITALS
SYSTOLIC BLOOD PRESSURE: 123 MMHG | HEART RATE: 111 BPM | WEIGHT: 149 LBS | BODY MASS INDEX: 22.58 KG/M2 | DIASTOLIC BLOOD PRESSURE: 74 MMHG | OXYGEN SATURATION: 100 % | HEIGHT: 68 IN

## 2024-10-10 DIAGNOSIS — F41.1 GENERALIZED ANXIETY DISORDER: ICD-10-CM

## 2024-10-10 DIAGNOSIS — F90.0 ADHD, PREDOMINANTLY INATTENTIVE TYPE: Primary | ICD-10-CM

## 2024-10-10 NOTE — PROGRESS NOTES
"[facility]  Follow-up Visit    Chief Complaint: \"I have been having some anxiety issues and I think I have ADHD\"     History of Present Illness: Rajani Gomez is a 24 y.o. female who presents today for initial evaluation regarding ADHD and anxiety consultation. Rajani presents unaccompanied in no acute distress and engages with me appropriately. Psychotropic regimen with which patient presents is described as buspirone 5 mg po TID, as needed, but Rajani stopped due to N/V.      History is positive for signs/symptoms suggestive of consistent and excessive worry across several domains of life that contributes to tension and irritability throughout the day.      Rajani has had a history trouble concentrating, trouble completing tasks, making errors in routine tasks, issues remembering obligations, trouble with organization, fidgeting, and associated irritability/anxiety with these deficits.     With regard to ADHD: I am presumptively treating patient for this issue; history as provided today, presentation today, and positive family history would suggest a distinct possibility this is a contributing factor to patient's dysfunction in life roles and engagements irrespective to screening results. Patient has learned and successfully implemented compensatory coping skills that, with the [employer] of layered stressors throughout adulthood, have begun to fail. Treating ADHD symptoms will likely be a concise and appropriate route to address anxiety and mood issues that are, at least to some degree, secondary to deficits related to traits of ADHD.      ASRS-v1.1  Part A  5/6  Part B  7/12     Total  12/18      Psychiatric screening is negative for pathognomonic history of TBI, seizures, keily, psychosis, violence, and suicidality.      We discussed non-stimulant ADHD agents vs. Stimulant agents-see below     Non-stimulants-clonidine/guanfacine off label use-can lower BP, be " sedating, and cause dizziness., bupropion XL off-label can increase BP, increase anxiety, dry mouth/dry eyes, and mood changes-known to lower seizure threshold OR atomoxetine-FDA indicated ADHD, Qelbree-FDA indicated for ADHD-takes longer for full effects, contribute to issues with BP and HR, appetite suppression, insomnia, and mood changes with worst case scenario being new-onset SI/mood changes.      Stimulants:  Patient has been made aware of the long-term risks of tachyphylaxis/dependence and uncomfortable withdrawal that may occur with abrupt discontinuation of this agent. I have advised taking medication holidays to mitigate risk of dependence and tolerance and have advised the patient with regard to common psychological dependence that can contribute to perceived diminishment in treatment effectiveness. Patient has been advised to monitor and limit caffeine intake while taking this agent. Patient has been made aware of common SE - diminished appetite, insomnia, hypertension - for which this agent has propensity. I have specifically reviewed issues related to misuse and diversion with the patient today.  Please keep this medication locked in a secure location, and away from children     I have counseled the patient with regard to diagnoses and the recommended treatment regimen as documented below: I will assume prescriptive responsibility for ADHD stimulant.  Offered to prescribe clonidine 0.1 mg p.o. twice daily for anxiety, patient wishes not to start anything at this time and will reach out should she feel that her anxiety has worsened.  Patient verbalized understanding that ADHD management can often increase anxiety and she has been advised to reach out should that occur.  The patient acknowledges the diagnoses per my rendered interpretation. Patient demonstrates awareness/understanding of viable alternatives for treatment as well as potential risks, benefits, and side effects associated with this regimen  "and is amenable to proceed in this fashion.      Recommended lifestyle changes: 30 minutes of activity to increase HR 2-3 days weekly.     Psychiatric History:  Diagnoses: none  Outpatient history: none  Inpatient history: none  Medication trials: buspirone (nausea) stopped making  Other treatment modalities: none  Self harm: No history  Suicide attempts: No  Abuse or neglect: None     Substance Use History:   Types/methods/frequency: *marijuana   Transtheoretical stage: Maintenence     Social History:  Residence: lives with partner, house, two children 3 in November  Vocation: no  Source of income: none  Last grade completed: 12 th  Pertinent developmental history: some concentration   Pertinent legal history: No history   Hobbies/interests: [employer] games, [employer]  Mandaen: N/A  Exercise:walking 40 minutes/day  5 days/week   Dietary habits: Peanut allergy  Sleep hygiene: variable, listens to ASMR  Social habits: feels she is well supported,   Sunlight: There are no concern for under-exposure.  Caffeine intake: mindful intake  Hydration habits: no pertinent issues    history: No       Interval History Rajani is a 24 y.o. female who presents today for follow-up Rajani presents unaccompanied in no acute distress and engages with me appropriately.  is mildly elevated.     Current treatment regimen includes:   -Adderall 5 mg p.o. am   Side-effects per given history: none.      Today the patient feels okay. She states that the medication causes her to feel tired-\"I could have gone to bed\" about an hour after taking it in the morning. Thought process and content are devoid of overt aberration suggestive of acute keily/psychosis. The patient denies SI/HI/AVH. There are not changes on exam today compared to most recent evaluation.     - sleep: no concerns-patient's ADHD medication has not affected sleep  - appetite: moderately controlled-patient's ADHD medication has not affected at appetite  Patient directed " "to use take a extra tablet (from her medication previous \"holiday\" day) around 12 noon for ONE day, and then take an extra tablet (from previous medication \"holiday\"day) with regular am dose for ONE day-patient then to return to previous dose schedule Adderall 5 mg po q day she is to note improvements/problems and to report this by 10/18, as her refill is due on 10/21. Will consider-Consider increasing dose, changing to extended release vs. adding a booster dose to even changing formulations-by starting methylphenidate.    Patient verbalizes understanding and is accepting that there is a bit of titration when it comes to dosing ADHD stimulants.   I have counseled the patient with regard to diagnoses and the recommended treatment regimen as documented below. Patient acknowledges the diagnoses per my rendered interpretation. Patient demonstrates understanding of potential risks/benefits/side effects associated with this regimen and is amenable to proceed in this fashion.     Assignment: begin journaling instances of overwhelm, response thereto, ideal response to cultivate insight and begin breaking a pattern of stimulus/response.      Social History     Socioeconomic History    Marital status: Single     Spouse name: Mckay    Highest education level: High school graduate   Tobacco Use    Smoking status: Never    Smokeless tobacco: Never   Vaping Use    Vaping status: Never Used   Substance and Sexual Activity    Alcohol use: Never    Drug use: Never    Sexual activity: Yes     Partners: Male     Birth control/protection: None       Tobacco use counseling/intervention:     patient does not use tobacco. Problem List:  Patient Active Problem List   Diagnosis    Gestational hypertension w/o significant proteinuria in 3rd trimester    Breech presentation of fetus    Postpartum follow-up     Allergy:   Allergies   Allergen Reactions    Peanut Oil Nausea And Vomiting    Peanut-Containing Drug Products Nausea And Vomiting    " "    Discontinued Medications:  There are no discontinued medications.    Current Medications:   Current Outpatient Medications   Medication Sig Dispense Refill    amphetamine-dextroamphetamine (Adderall) 5 MG tablet Take 1 tablet by mouth Daily for 30 days. 30 tablet 0     No current facility-administered medications for this visit.     Past Medical History:  Past Medical History:   Diagnosis Date    Anxiety     Migraine      Past Surgical History:  Past Surgical History:   Procedure Laterality Date     SECTION N/A 2021    Procedure:  SECTION PRIMARY;  Surgeon: Kwame Abernathy Sr., MD;  Location: [facility];  Service: Gynecology;  Laterality: N/A;       MENTAL STATUS EXAM   General Appearance:  Cleanly groomed and dressed and well developed  Attitude:  Cooperative, polite and candid  Motor Activity:  Normal gait, posture  Muscle Strength:  Normal  Speech:  Normal rate, tone, volume  Language:  Spontaneous  Mood and affect:  Normal, pleasant  Hopelessness:  Denies  Loneliness: Denies  Thought Process:  Logical and goal-directed  Associations/ Thought Content:  No delusions  Hallucinations:  None  Suicidal Ideations:  Not present  Homicidal Ideation:  Not present  Sensorium:  Alert and clear  Immediate Recall, Recent, and Remote Memory:  Intact  Attention Span/ Concentration:  Good  Fund of Knowledge:  Appropriate for age and educational level  Intellectual Functioning:  Above average  Insight:  Good  Judgement:  Good  Reliability:  Good  Impulse Control:  Good      Vital Signs:   /74   Pulse 111   Ht 172.7 cm (68\")   Wt 67.6 kg (149 lb)   SpO2 100%   BMI 22.66 kg/m²    Lab Results:   Lab on 09/10/2024   Component Date Value Ref Range Status    Amphet/Methamphet, Screen 09/10/2024 Negative  Negative Final    Barbiturates Screen, Urine 09/10/2024 Negative  Negative Final    Benzodiazepine Screen, Urine 09/10/2024 Negative  Negative Final    Cocaine Screen, Urine 09/10/2024 " Negative  Negative Final    Opiate Screen 09/10/2024 Negative  Negative Final    THC, Screen, Urine 09/10/2024 Negative  Negative Final    Methadone Screen, Urine 09/10/2024 Negative  Negative Final    Oxycodone Screen, Urine 09/10/2024 Negative  Negative Final    Fentanyl, Urine 09/10/2024 Negative  Negative Final       ASSESSMENT AND PLAN:    ICD-10-CM ICD-9-CM   1. ADHD, predominantly inattentive type  F90.0 314.00   2. Generalized anxiety disorder  F41.1 300.02       Rajani is a 24 y.o. female who presents today for follow-up regarding medication managment. We have discussed the interval history and the treatment plan below, including potential R/B/SE of the recommended regimen of which the patient demonstrates understanding. Patient is agreeable to call 911 or go to the nearest ER should she become concerned for her own safety and/or the safety of those around her. There are are no overt indices of acute keily/psychosis on exam today. ADALI reviewed and is as expected.    Medication regimen: At this time no changes with medications continue- Adderall 5 mg p.o. every morning-but expect adjustments to be made prior to next fallow up; patient is advised not to misuse prescribed medications or to use them with any exogenous substances that aren't disclosed to this provider as they may interact with the regimen to the patient's detriment.   Risk Assessment: protracted risk is moderate, imminent risk is low.  Do note that this is subject to change with the Yarsani of new stressors, treatment non-adherence, use of substances, and/or new medical ails.   Monitoring: reviewed labs/imaging as populated above; ordered  Therapy: deferred  Follow-up: one month  Communications: N/A    TREATMENT PLAN/GOALS: challenge patterns of living conducive to symptom burden, implement recommended regimen as above with augmentative, intermittent supportive psychotherapy to reduce symptom burden. Patient acknowledged and verbally  consented to continue treatment. The importance of adherence to the recommended treatment and interval follow-up appointments was again emphasized today: patient has good treatment adherence per given history. Patient was today reminded to limit daily caffeine intake, hydrate appropriately, eat healthy and nutritious foods, engage sleep hygiene measures, engage appropriate exposure to sunlight, engage with hobbies in balance with life necessities, and exercise appropriate to their capacity to do so.         Parts of this note are electronic transcriptions/translations of spoken language to printed text using the Dragon Dictation system.    Electronically signed by GI Dove, 10/10/24

## (undated) DEVICE — NEEDLE,18GX1.5",REG,BEVEL: Brand: MEDLINE

## (undated) DEVICE — LARGE, DISPOSABLE C-SECTION RETRACTOR: Brand: ALEXIS ® O C-SECTION PROTECTOR/RETRACTOR

## (undated) DEVICE — SUT GUT CHRM 1 915H BX/36

## (undated) DEVICE — VIOLET BRAIDED (POLYGLACTIN 910), SYNTHETIC ABSORBABLE SUTURE: Brand: COATED VICRYL

## (undated) DEVICE — INTENDED FOR TISSUE SEPARATION, AND OTHER PROCEDURES THAT REQUIRE A SHARP SURGICAL BLADE TO PUNCTURE OR CUT.: Brand: BARD-PARKER ® CARBON RIB-BACK BLADES

## (undated) DEVICE — PAD GRND REM POLYHESIVE A/ DISP

## (undated) DEVICE — SUT VIC 3/0 CTI 36IN J944H

## (undated) DEVICE — GLV SURG BIOGEL M LTX PF 7 1/2

## (undated) DEVICE — DEV TRANSF BLD W/LUER ADPT CA/198

## (undated) DEVICE — CVR HNDL LT SURG ACCSSRY BLU STRL

## (undated) DEVICE — GLV SURG SENSICARE PI LF PF 8 GRN STRL

## (undated) DEVICE — Device: Brand: PORTEX

## (undated) DEVICE — UNDYED BRAIDED (POLYGLACTIN 910), SYNTHETIC ABSORBABLE SUTURE: Brand: COATED VICRYL

## (undated) DEVICE — TRY CATH FOL ADVANCE SIL W/BAG 16F

## (undated) DEVICE — C SECTION PACK: Brand: MEDLINE INDUSTRIES, INC.